# Patient Record
Sex: MALE | Race: BLACK OR AFRICAN AMERICAN | NOT HISPANIC OR LATINO | Employment: FULL TIME | URBAN - METROPOLITAN AREA
[De-identification: names, ages, dates, MRNs, and addresses within clinical notes are randomized per-mention and may not be internally consistent; named-entity substitution may affect disease eponyms.]

---

## 2018-01-11 ENCOUNTER — HOSPITAL ENCOUNTER (EMERGENCY)
Facility: HOSPITAL | Age: 28
Discharge: HOME | End: 2018-01-11
Attending: FAMILY MEDICINE
Payer: COMMERCIAL

## 2018-01-11 VITALS
DIASTOLIC BLOOD PRESSURE: 51 MMHG | SYSTOLIC BLOOD PRESSURE: 121 MMHG | TEMPERATURE: 99 F | RESPIRATION RATE: 16 BRPM | OXYGEN SATURATION: 97 % | HEART RATE: 98 BPM

## 2018-01-11 DIAGNOSIS — R68.89 FLU-LIKE SYMPTOMS: Primary | ICD-10-CM

## 2018-01-11 LAB
BUN SERPL-MCNC: 8 MG/DL (ref 6–30)
CHLORIDE SERPL-SCNC: 101 MMOL/L (ref 95–110)
CREAT SERPL-MCNC: 1 MG/DL (ref 0.5–1.4)
FLUAV AG SPEC QL IA: NEGATIVE
FLUBV AG SPEC QL IA: NEGATIVE
GLUCOSE SERPL-MCNC: 84 MG/DL (ref 70–110)
HCT VFR BLD CALC: 44 %PCV (ref 36–54)
POC IONIZED CALCIUM: 1.14 MMOL/L (ref 1.06–1.42)
POC TCO2 (MEASURED): 22 MMOL/L (ref 23–29)
POTASSIUM BLD-SCNC: 3.3 MMOL/L (ref 3.5–5.1)
SAMPLE: ABNORMAL
SODIUM BLD-SCNC: 137 MMOL/L (ref 136–145)
SPECIMEN SOURCE: NORMAL

## 2018-01-11 PROCEDURE — 99283 EMERGENCY DEPT VISIT LOW MDM: CPT | Mod: 25

## 2018-01-11 PROCEDURE — 96360 HYDRATION IV INFUSION INIT: CPT

## 2018-01-11 PROCEDURE — 25000003 PHARM REV CODE 250: Performed by: PHYSICIAN ASSISTANT

## 2018-01-11 PROCEDURE — 99283 EMERGENCY DEPT VISIT LOW MDM: CPT | Mod: ,,, | Performed by: PHYSICIAN ASSISTANT

## 2018-01-11 PROCEDURE — 87400 INFLUENZA A/B EACH AG IA: CPT

## 2018-01-11 PROCEDURE — 25000003 PHARM REV CODE 250: Performed by: FAMILY MEDICINE

## 2018-01-11 RX ORDER — OSELTAMIVIR PHOSPHATE 75 MG/1
75 CAPSULE ORAL 2 TIMES DAILY
Qty: 10 CAPSULE | Refills: 0 | Status: SHIPPED | OUTPATIENT
Start: 2018-01-11 | End: 2018-01-16

## 2018-01-11 RX ORDER — ACETAMINOPHEN 325 MG/1
650 TABLET ORAL
Status: COMPLETED | OUTPATIENT
Start: 2018-01-11 | End: 2018-01-11

## 2018-01-11 RX ORDER — BENZONATATE 100 MG/1
100 CAPSULE ORAL 3 TIMES DAILY PRN
Qty: 15 CAPSULE | Refills: 0 | Status: SHIPPED | OUTPATIENT
Start: 2018-01-11 | End: 2018-01-21

## 2018-01-11 RX ORDER — IBUPROFEN 600 MG/1
600 TABLET ORAL
Status: COMPLETED | OUTPATIENT
Start: 2018-01-11 | End: 2018-01-11

## 2018-01-11 RX ADMIN — SODIUM CHLORIDE 1000 ML: 0.9 INJECTION, SOLUTION INTRAVENOUS at 01:01

## 2018-01-11 RX ADMIN — ACETAMINOPHEN 650 MG: 325 TABLET ORAL at 11:01

## 2018-01-11 RX ADMIN — IBUPROFEN 600 MG: 600 TABLET, FILM COATED ORAL at 01:01

## 2018-01-11 NOTE — ED PROVIDER NOTES
Encounter Date: 1/11/2018       History     Chief Complaint   Patient presents with    Fever     began this morning      28 y/o AAM with no significant medical history presents to the ED c/o flu like symptoms since this morning. He reports fever, chills, myalgias, dry cough, nausea without vomiting, lightheadedness. He reports sick contacts at work. He did not have a flu vaccine this year. He denies chest pain, SOB, dysuria, diarrhea, abdominal pain, headache, weakness. He denies tobacco, alcohol, or drug use.      The history is provided by the patient.     Review of patient's allergies indicates:   Allergen Reactions    Shrimp Swelling     History reviewed. No pertinent past medical history.  Past Surgical History:   Procedure Laterality Date    NONE  10/17/2013     Family History   Problem Relation Age of Onset    Hypertension Father     Hypertension Paternal Grandmother     Stroke Paternal Grandfather     Diabetes Neg Hx     Cancer Neg Hx     Heart disease Neg Hx      Social History   Substance Use Topics    Smoking status: Former Smoker    Smokeless tobacco: Never Used    Alcohol use No     Review of Systems   Constitutional: Positive for chills, fatigue and fever.   HENT: Positive for congestion. Negative for rhinorrhea and sore throat.    Eyes: Negative for photophobia and visual disturbance.   Respiratory: Positive for cough. Negative for shortness of breath.    Cardiovascular: Negative for chest pain.   Gastrointestinal: Positive for nausea. Negative for abdominal pain, constipation, diarrhea and vomiting.   Genitourinary: Negative for dysuria and hematuria.   Musculoskeletal: Positive for myalgias. Negative for neck pain and neck stiffness.   Skin: Negative for rash and wound.   Allergic/Immunologic: Negative for food allergies.   Neurological: Positive for light-headedness. Negative for syncope, weakness, numbness and headaches.   Psychiatric/Behavioral: Negative for confusion.       Physical  Exam     Initial Vitals [01/11/18 1055]   BP Pulse Resp Temp SpO2   (!) 124/59 (!) 115 18 (!) 102.7 °F (39.3 °C) 98 %      MAP       80.67         Physical Exam    Nursing note and vitals reviewed.  Constitutional: He appears well-developed and well-nourished. He is not diaphoretic. No distress.   HENT:   Head: Normocephalic and atraumatic.   Mouth/Throat: Uvula is midline, oropharynx is clear and moist and mucous membranes are normal.   Neck: Normal range of motion. Neck supple.   Cardiovascular: Regular rhythm and normal heart sounds. Tachycardia present.  Exam reveals no gallop and no friction rub.    No murmur heard.  Pulmonary/Chest: Breath sounds normal. He has no wheezes. He has no rhonchi. He has no rales. He exhibits no tenderness.   Abdominal: Soft. Bowel sounds are normal. There is no tenderness. There is no rebound and no guarding.   Musculoskeletal: Normal range of motion.   Neurological: He is alert and oriented to person, place, and time.   Skin: Skin is warm and dry. No rash noted. No erythema.   Psychiatric: He has a normal mood and affect.         ED Course   Procedures  Labs Reviewed   ISTAT PROCEDURE - Abnormal; Notable for the following:        Result Value    POC Potassium 3.3 (*)     POC TCO2 (MEASURED) 22 (*)     All other components within normal limits   INFLUENZA A AND B ANTIGEN           Medical Decision Making:   History:   Old Medical Records: I decided to obtain old medical records.  Clinical Tests:   Lab Tests: Ordered and Reviewed       APC / Resident Notes:   28 y/o AAM with no significant medical history presents to the ED c/o flu like symptoms since this morning. Tachycardic. Patient is orthostatic. Lungs CTA. Abdomen soft and nontender. DDx includes but is not limited to influenza, dehydration, EDWIN, viral syndrome. Will get labs, IVF.     Influenza negative. Chem 8 with no acute abnormality.    He was given IVF, tylenol and advil in the ED. Symptoms consistent with influenza.  Will treat. Stable for discharge.    He was discharged with a prescription for tamiflu and tessalon.  He will follow up with his PCP.  All of the patient's questions were answered.  I reviewed the patient's chart and labs and discussed the case with my supervising physician.                 ED Course      Clinical Impression:   The encounter diagnosis was Flu-like symptoms.    Disposition:   Disposition: Discharged  Condition: Stable                        Radha Dodge PA-C  01/11/18 5104

## 2018-01-11 NOTE — ED TRIAGE NOTES
Pt reports he woke up this am having chills and subjective fever, reports he also had L leg pain but denies any other symptoms.  Reports he feels better after treatment given in waiting room.

## 2018-09-03 ENCOUNTER — NURSE TRIAGE (OUTPATIENT)
Dept: ADMINISTRATIVE | Facility: CLINIC | Age: 28
End: 2018-09-03

## 2019-02-23 ENCOUNTER — HOSPITAL ENCOUNTER (EMERGENCY)
Facility: HOSPITAL | Age: 29
Discharge: HOME | End: 2019-02-23
Attending: EMERGENCY MEDICINE
Payer: COMMERCIAL

## 2019-02-23 VITALS
DIASTOLIC BLOOD PRESSURE: 71 MMHG | WEIGHT: 150 LBS | HEART RATE: 76 BPM | BODY MASS INDEX: 22.22 KG/M2 | RESPIRATION RATE: 16 BRPM | HEIGHT: 69 IN | OXYGEN SATURATION: 99 % | SYSTOLIC BLOOD PRESSURE: 128 MMHG | TEMPERATURE: 98 F

## 2019-02-23 DIAGNOSIS — S61.213A LACERATION OF LEFT MIDDLE FINGER WITHOUT FOREIGN BODY WITHOUT DAMAGE TO NAIL, INITIAL ENCOUNTER: Primary | ICD-10-CM

## 2019-02-23 PROCEDURE — 90715 TDAP VACCINE 7 YRS/> IM: CPT | Performed by: PHYSICIAN ASSISTANT

## 2019-02-23 PROCEDURE — 12001 RPR S/N/AX/GEN/TRNK 2.5CM/<: CPT

## 2019-02-23 PROCEDURE — 99284 PR EMERGENCY DEPT VISIT,LEVEL IV: ICD-10-PCS | Mod: 25,,, | Performed by: PHYSICIAN ASSISTANT

## 2019-02-23 PROCEDURE — 63600175 PHARM REV CODE 636 W HCPCS: Performed by: PHYSICIAN ASSISTANT

## 2019-02-23 PROCEDURE — 12001 PR RESUPERF WND BODY <2.5CM: ICD-10-PCS | Mod: F2,,, | Performed by: PHYSICIAN ASSISTANT

## 2019-02-23 PROCEDURE — 99284 EMERGENCY DEPT VISIT MOD MDM: CPT | Mod: 25,,, | Performed by: PHYSICIAN ASSISTANT

## 2019-02-23 PROCEDURE — 99282 EMERGENCY DEPT VISIT SF MDM: CPT | Mod: 25

## 2019-02-23 PROCEDURE — 12001 RPR S/N/AX/GEN/TRNK 2.5CM/<: CPT | Mod: F2,,, | Performed by: PHYSICIAN ASSISTANT

## 2019-02-23 PROCEDURE — 90471 IMMUNIZATION ADMIN: CPT | Performed by: PHYSICIAN ASSISTANT

## 2019-02-23 RX ADMIN — CLOSTRIDIUM TETANI TOXOID ANTIGEN (FORMALDEHYDE INACTIVATED), CORYNEBACTERIUM DIPHTHERIAE TOXOID ANTIGEN (FORMALDEHYDE INACTIVATED), BORDETELLA PERTUSSIS TOXOID ANTIGEN (GLUTARALDEHYDE INACTIVATED), BORDETELLA PERTUSSIS FILAMENTOUS HEMAGGLUTININ ANTIGEN (FORMALDEHYDE INACTIVATED), BORDETELLA PERTUSSIS PERTACTIN ANTIGEN, AND BORDETELLA PERTUSSIS FIMBRIAE 2/3 ANTIGEN 0.5 ML: 5; 2; 2.5; 5; 3; 5 INJECTION, SUSPENSION INTRAMUSCULAR at 01:02

## 2019-02-23 NOTE — ED TRIAGE NOTES
30 y/o male presents to ed with cc left middle finger laceration, palmar surface, pt was emptying trash cans after a parade and cut finger, pt has full rom of fingers on left hand, bleeding controlled, pt denies numbness to distal fingertip

## 2020-03-09 ENCOUNTER — OFFICE VISIT (OUTPATIENT)
Dept: URGENT CARE | Facility: CLINIC | Age: 30
End: 2020-03-09
Payer: COMMERCIAL

## 2020-03-09 VITALS
HEIGHT: 69 IN | TEMPERATURE: 98 F | WEIGHT: 150 LBS | DIASTOLIC BLOOD PRESSURE: 81 MMHG | BODY MASS INDEX: 22.22 KG/M2 | OXYGEN SATURATION: 98 % | HEART RATE: 70 BPM | SYSTOLIC BLOOD PRESSURE: 125 MMHG

## 2020-03-09 DIAGNOSIS — A64 STD (MALE): ICD-10-CM

## 2020-03-09 DIAGNOSIS — R36.9 DISCHARGE FROM PENIS: Primary | ICD-10-CM

## 2020-03-09 LAB
BILIRUB UR QL STRIP: POSITIVE
GLUCOSE UR QL STRIP: NEGATIVE
KETONES UR QL STRIP: NEGATIVE
LEUKOCYTE ESTERASE UR QL STRIP: POSITIVE
PH, POC UA: 5 (ref 5–8)
POC BLOOD, URINE: POSITIVE
POC NITRATES, URINE: NEGATIVE
PROT UR QL STRIP: POSITIVE
SP GR UR STRIP: 1.02 (ref 1–1.03)
UROBILINOGEN UR STRIP-ACNC: 4 (ref 0.3–2.2)

## 2020-03-09 PROCEDURE — 96372 THER/PROPH/DIAG INJ SC/IM: CPT | Mod: S$GLB,,, | Performed by: FAMILY MEDICINE

## 2020-03-09 PROCEDURE — 87491 CHLMYD TRACH DNA AMP PROBE: CPT

## 2020-03-09 PROCEDURE — 81003 URINALYSIS AUTO W/O SCOPE: CPT | Mod: QW,S$GLB,, | Performed by: FAMILY MEDICINE

## 2020-03-09 PROCEDURE — 81003 POCT URINALYSIS, DIPSTICK, AUTOMATED, W/O SCOPE: ICD-10-PCS | Mod: QW,S$GLB,, | Performed by: FAMILY MEDICINE

## 2020-03-09 PROCEDURE — 99203 OFFICE O/P NEW LOW 30 MIN: CPT | Mod: 25,S$GLB,, | Performed by: FAMILY MEDICINE

## 2020-03-09 PROCEDURE — 99203 PR OFFICE/OUTPT VISIT, NEW, LEVL III, 30-44 MIN: ICD-10-PCS | Mod: 25,S$GLB,, | Performed by: FAMILY MEDICINE

## 2020-03-09 PROCEDURE — 96372 PR INJECTION,THERAP/PROPH/DIAG2ST, IM OR SUBCUT: ICD-10-PCS | Mod: S$GLB,,, | Performed by: FAMILY MEDICINE

## 2020-03-09 RX ORDER — CEFTRIAXONE 250 MG/1
250 INJECTION, POWDER, FOR SOLUTION INTRAMUSCULAR; INTRAVENOUS
Status: COMPLETED | OUTPATIENT
Start: 2020-03-09 | End: 2020-03-09

## 2020-03-09 RX ORDER — DOXYCYCLINE 100 MG/1
100 CAPSULE ORAL 2 TIMES DAILY
Qty: 20 CAPSULE | Refills: 0 | Status: SHIPPED | OUTPATIENT
Start: 2020-03-09 | End: 2020-03-19

## 2020-03-09 RX ADMIN — CEFTRIAXONE 250 MG: 250 INJECTION, POWDER, FOR SOLUTION INTRAMUSCULAR; INTRAVENOUS at 04:03

## 2020-03-09 NOTE — PATIENT INSTRUCTIONS
If You Think You Have an STD  Treating a sexually transmitted disease (STD) early limits the problems they can cause. If you have an STD, get treated right away. Ask your partner to be tested, too. Then avoid sex until youve finished treatment and your healthcare provider says its OK to have sex again.    Follow your treatment plan  Treatment depends on the type of STD you have. Common treatments include injections and oral pills or liquids. Creams and gels can be applied to sores caused by certain STDs. Follow the tips below:  · Get new treatment for each new STD.  · Dont use old medicine, even for the same STD. Use medicines as directed.  · Dont share medicine unless instructed to do so by your healthcare provider or clinic.  Talk to your partner  If you have an STD, its your duty to tell all your recent partners so they can be tested and treated. This is one important way to prevent the disease from being spread. Telling a partner that you have an STD can be hard. You may be embarrassed, angry, or afraid. Its often unclear who had the STD first. So try not to place blame. Your healthcare provider may offer some advice on how to begin.  Prevent future problems  Even after youve been treated, you can still be infected again. This is a common problem. It happens when a partner passes the STD back to you. To avoid this, your partner must be tested. He or she may also need treatment. After treatment, go to any scheduled follow-up visits. Then prevent future problems with safer sex. Limit your number of partners and always use a latex condom.  Diagnosing STDS  Your healthcare provider will take a health history and examine you. During your health history, you will be asked about your sex habits and health history. You may also be asked about drug use. Give honest answers. Your healthcare provider will then check your body for signs of STDs. He or she also may perform one or more of the following  tests:  · Fluid is swabbed from any sores. Samples also may be taken from the vagina, penis, mouth, or rectum. The samples are then tested for STDs.  · Blood or urine samples may be taken. They are checked for viruses or bacteria that cause STDs.  · For women, cells from the cervix (where the vagina and uterus meet) are checked for signs of cancer. This is called a Pap smear. If cell changes are found, a magnifying scope may be used to take a closer look (colposcopy).   Date Last Reviewed: 12/1/2016  © 9725-4412 [employer]. [address] [place] [address], [place] [address]. All rights reserved. This information is not intended as a substitute for professional medical care. Always follow your healthcare professional's instructions.

## 2020-03-09 NOTE — PROGRESS NOTES
"Subjective:       Patient ID: Samson Aguilera is a 30 y.o. male.    Vitals:  height is 5' 9" (1.753 m) and weight is 68 kg (150 lb). His temperature is 97.6 °F (36.4 °C). His blood pressure is 125/81 and his pulse is 70. His oxygen saturation is 98%.     Chief Complaint: Penile Discharge    Penile Discharge   The patient's primary symptoms include penile discharge and penile pain. The patient's pertinent negatives include no scrotal swelling or testicular pain. This is a new problem. Episode onset: 7 days ago. The problem occurs constantly. The problem has been gradually worsening. The pain is mild. Associated symptoms include chills, discolored urine, dysuria, frequency and urgency. Pertinent negatives include no abdominal pain, fever, nausea, rash or vomiting. The penile discharge was yellow. The symptoms are aggravated by urination and intercourse. Treatments tried: azo. The treatment provided no relief. He is sexually active. He inconsistently uses condoms. No, his partner does not have an STD. His past medical history is significant for chlamydia and gonorrhea.       Constitution: Positive for chills. Negative for fever.   Neck: Negative for painful lymph nodes.   Gastrointestinal: Negative for abdominal pain, nausea and vomiting.   Genitourinary: Positive for dysuria, frequency, urgency, penile discharge and penile pain. Negative for urine decreased, hematuria, history of kidney stones, genital trauma, painful intercourse, genital sore, painful ejaculation, penile swelling, scrotal swelling and testicular pain.   Musculoskeletal: Negative for back pain.   Skin: Negative for rash and lesion.   Hematologic/Lymphatic: Negative for swollen lymph nodes.       Objective:      Physical Exam   Constitutional: He is oriented to person, place, and time. He appears well-developed and well-nourished.   HENT:   Head: Normocephalic and atraumatic.   Right Ear: External ear normal.   Left Ear: External ear normal.   Nose: " Nose normal.   Mouth/Throat: Mucous membranes are normal.   Eyes: Conjunctivae and lids are normal.   Neck: Trachea normal and full passive range of motion without pain. Neck supple.   Cardiovascular: Normal rate, regular rhythm and normal heart sounds.   Pulmonary/Chest: Effort normal and breath sounds normal. No respiratory distress.   Abdominal: Soft. Normal appearance and bowel sounds are normal. He exhibits no distension, no abdominal bruit, no pulsatile midline mass and no mass. There is no tenderness.   Genitourinary: Testes normal. Uncircumcised. Discharge found.         Musculoskeletal: Normal range of motion. He exhibits no edema.   Neurological: He is alert and oriented to person, place, and time. He has normal strength.   Skin: Skin is warm, dry, intact, not diaphoretic and not pale.   Psychiatric: He has a normal mood and affect. His speech is normal and behavior is normal. Judgment and thought content normal. Cognition and memory are normal.   Nursing note and vitals reviewed.        Assessment:       1. Discharge from penis    2. STD (male)        Plan:         Discharge from penis  -     POCT Urinalysis, Dipstick, Automated, W/O Scope  -     cefTRIAXone injection 250 mg  -     doxycycline (MONODOX) 100 MG capsule; Take 1 capsule (100 mg total) by mouth 2 (two) times daily. for 10 days  Dispense: 20 capsule; Refill: 0  -     C. trachomatis/N. gonorrhoeae by AMP DNA [facility]; Urine    STD (male)

## 2020-03-10 LAB
C TRACH DNA SPEC QL NAA+PROBE: NOT DETECTED
N GONORRHOEA DNA SPEC QL NAA+PROBE: DETECTED

## 2020-03-11 ENCOUNTER — TELEPHONE (OUTPATIENT)
Dept: URGENT CARE | Facility: CLINIC | Age: 30
End: 2020-03-11

## 2020-03-11 NOTE — TELEPHONE ENCOUNTER
Called to notify patient of positive culture. Pt treated appropriately. Voicemail not set up. Unable to leave message.

## 2020-03-19 NOTE — TELEPHONE ENCOUNTER
Spoke with patient. Informed patient of results. Patient verbalized understanding. Patient reports feeling much better since visit.

## 2020-03-26 ENCOUNTER — OFFICE VISIT (OUTPATIENT)
Dept: URGENT CARE | Facility: CLINIC | Age: 30
End: 2020-03-26
Payer: COMMERCIAL

## 2020-03-26 VITALS
SYSTOLIC BLOOD PRESSURE: 116 MMHG | HEART RATE: 68 BPM | OXYGEN SATURATION: 98 % | RESPIRATION RATE: 20 BRPM | DIASTOLIC BLOOD PRESSURE: 77 MMHG | TEMPERATURE: 100 F

## 2020-03-26 DIAGNOSIS — R09.82 POST-NASAL DRIP: Primary | ICD-10-CM

## 2020-03-26 DIAGNOSIS — Z20.822 EXPOSURE TO COVID-19 VIRUS: ICD-10-CM

## 2020-03-26 LAB
CTP QC/QA: YES
CTP QC/QA: YES
FLUAV AG NPH QL: NEGATIVE
FLUBV AG NPH QL: NEGATIVE
S PYO RRNA THROAT QL PROBE: NEGATIVE

## 2020-03-26 PROCEDURE — 87880 POCT RAPID STREP A: ICD-10-PCS | Mod: QW,S$GLB,, | Performed by: NURSE PRACTITIONER

## 2020-03-26 PROCEDURE — 99214 OFFICE O/P EST MOD 30 MIN: CPT | Mod: 25,S$GLB,, | Performed by: NURSE PRACTITIONER

## 2020-03-26 PROCEDURE — 99214 PR OFFICE/OUTPT VISIT, EST, LEVL IV, 30-39 MIN: ICD-10-PCS | Mod: 25,S$GLB,, | Performed by: NURSE PRACTITIONER

## 2020-03-26 PROCEDURE — 87880 STREP A ASSAY W/OPTIC: CPT | Mod: QW,S$GLB,, | Performed by: NURSE PRACTITIONER

## 2020-03-26 PROCEDURE — 87804 INFLUENZA ASSAY W/OPTIC: CPT | Mod: QW,S$GLB,, | Performed by: NURSE PRACTITIONER

## 2020-03-26 PROCEDURE — 87804 POCT INFLUENZA A/B: ICD-10-PCS | Mod: 59,QW,S$GLB, | Performed by: NURSE PRACTITIONER

## 2020-03-26 RX ORDER — CETIRIZINE HYDROCHLORIDE, PSEUDOEPHEDRINE HYDROCHLORIDE 5; 120 MG/1; MG/1
1 TABLET, FILM COATED, EXTENDED RELEASE ORAL 2 TIMES DAILY
Qty: 20 TABLET | Refills: 1 | Status: SHIPPED | OUTPATIENT
Start: 2020-03-26 | End: 2021-03-26

## 2020-03-26 RX ORDER — PROMETHAZINE HYDROCHLORIDE AND DEXTROMETHORPHAN HYDROBROMIDE 6.25; 15 MG/5ML; MG/5ML
5 SYRUP ORAL
Qty: 118 ML | Refills: 0 | Status: SHIPPED | OUTPATIENT
Start: 2020-03-26 | End: 2020-04-05

## 2020-03-26 RX ORDER — BENZONATATE 200 MG/1
200 CAPSULE ORAL 3 TIMES DAILY PRN
Qty: 20 CAPSULE | Refills: 0 | Status: SHIPPED | OUTPATIENT
Start: 2020-03-26 | End: 2020-04-05

## 2020-03-26 RX ORDER — ALBUTEROL SULFATE 90 UG/1
2 AEROSOL, METERED RESPIRATORY (INHALATION) EVERY 6 HOURS PRN
Qty: 6.7 G | Refills: 0 | Status: SHIPPED | OUTPATIENT
Start: 2020-03-26 | End: 2022-11-04 | Stop reason: ALTCHOICE

## 2020-03-26 RX ORDER — MONTELUKAST SODIUM 10 MG/1
10 TABLET ORAL DAILY
Qty: 30 TABLET | Refills: 2 | Status: SHIPPED | OUTPATIENT
Start: 2020-03-26 | End: 2021-03-26

## 2020-03-26 NOTE — PROGRESS NOTES
Subjective:       Patient ID: Samson Aguilera is a 30 y.o. male.    Vitals:  temperature is 99.6 °F (37.6 °C). His blood pressure is 116/77 and his pulse is 68. His respiration is 20 and oxygen saturation is 98%.     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 Pt denies cough, SOB, wheezing and fever. Does have a PND that has been ongoing. Pt lives in an apartment.     Nausea   This is a new problem. The current episode started today. The problem occurs intermittently. The problem has been gradually worsening. Associated symptoms include nausea. Pertinent negatives include no arthralgias, chest pain, chills, congestion, coughing, fatigue, fever, headaches, joint swelling, myalgias, rash, sore throat, vertigo or vomiting. Nothing aggravates the symptoms. He has tried nothing for the symptoms.       Constitution: Negative for chills, fatigue and fever.   HENT: Negative for congestion and sore throat.    Neck: Negative for painful lymph nodes.   Cardiovascular: Negative for chest pain and leg swelling.   Eyes: Negative for double vision and blurred vision.   Respiratory: Negative for cough and shortness of breath.    Gastrointestinal: Positive for nausea. Negative for vomiting and diarrhea.   Genitourinary: Negative for dysuria, frequency and urgency.   Musculoskeletal: Negative for joint pain, joint swelling, muscle cramps and muscle ache.   Skin: Negative for color change, pale and rash.   Allergic/Immunologic: Negative for seasonal allergies.   Neurological: Negative for dizziness, history of vertigo, light-headedness, passing out and headaches.   Hematologic/Lymphatic: Negative for swollen lymph nodes, easy bruising/bleeding and history of blood clots. Does not bruise/bleed easily.   Psychiatric/Behavioral: Negative for nervous/anxious, sleep disturbance and depression. The patient is not  nervous/anxious.        Objective:      Physical Exam   Constitutional: He is oriented to person, place, and time. He appears well-developed and well-nourished. He is cooperative.  Non-toxic appearance. He does not have a sickly appearance. He does not appear ill. No distress.   HENT:   Head: Normocephalic and atraumatic.   Right Ear: Hearing, tympanic membrane, external ear and ear canal normal.   Left Ear: Hearing, tympanic membrane, external ear and ear canal normal.   Nose: Mucosal edema and rhinorrhea present. No nasal deformity. No epistaxis. Right sinus exhibits maxillary sinus tenderness. Right sinus exhibits no frontal sinus tenderness. Left sinus exhibits maxillary sinus tenderness. Left sinus exhibits no frontal sinus tenderness.   Mouth/Throat: Uvula is midline and mucous membranes are normal. No trismus in the jaw. Normal dentition. No uvula swelling. Posterior oropharyngeal edema and posterior oropharyngeal erythema present. No oropharyngeal exudate. Tonsils are 2+ on the right. Tonsils are 2+ on the left.   Eyes: Pupils are equal, round, and reactive to light. Conjunctivae, EOM and lids are normal. Right eye exhibits no discharge. Left eye exhibits no discharge. No scleral icterus.   Neck: Trachea normal, normal range of motion, full passive range of motion without pain and phonation normal. Neck supple. No neck rigidity. No edema and no erythema present.   Cardiovascular: Normal rate, regular rhythm, normal heart sounds, intact distal pulses and normal pulses.   Pulmonary/Chest: Effort normal and breath sounds normal. No respiratory distress. He has no decreased breath sounds. He has no rhonchi.   Abdominal: Soft. Normal appearance and bowel sounds are normal. He exhibits no distension, no pulsatile midline mass and no mass. There is no tenderness.   Musculoskeletal: Normal range of motion. He exhibits no edema or deformity.   Neurological: He is alert and oriented to person, place, and time. He is  not disoriented. No cranial nerve deficit or sensory deficit. He exhibits normal muscle tone. Coordination and gait normal.   ..Pt communicating without difficulty, speaks clearly and answers appropriately with unlabored breathing. No neuro deficits noted.      Skin: Skin is warm, dry, intact, not diaphoretic and not pale.   Psychiatric: He has a normal mood and affect. His speech is normal and behavior is normal. Judgment and thought content normal. Cognition and memory are normal.   Nursing note and vitals reviewed.        Assessment:       1. Post-nasal drip    2. Exposure to Covid-19 Virus        Plan:         Post-nasal drip  -     POCT Influenza A/B  -     POCT rapid strep A  -     albuterol (PROVENTIL/VENTOLIN HFA) 90 mcg/actuation inhaler; Inhale 2 puffs into the lungs every 6 (six) hours as needed for Wheezing. Cough and Shortness of Breath. Rescue  Dispense: 6.7 g; Refill: 0  -     promethazine-dextromethorphan (PROMETHAZINE-DM) 6.25-15 mg/5 mL Syrp; Take 5 mLs by mouth every 4 to 6 hours as needed.  Dispense: 118 mL; Refill: 0  -     benzonatate (TESSALON) 200 MG capsule; Take 1 capsule (200 mg total) by mouth 3 (three) times daily as needed for Cough.  Dispense: 20 capsule; Refill: 0  -     montelukast (SINGULAIR) 10 mg tablet; Take 1 tablet (10 mg total) by mouth once daily.  Dispense: 30 tablet; Refill: 2  -     cetirizine-pseudoephedrine 5-120 mg Tb12; Take 1 tablet by mouth 2 (two) times daily.  Dispense: 20 tablet; Refill: 1    Exposure to Covid-19 Virus

## 2020-03-26 NOTE — PATIENT INSTRUCTIONS
Urgent Care Management:  - Treatment plan discussed.  - PCP recommendations given.  - Return precautions advised.  - Patient agrees with and understands plan of care.    Patient Instructions, Education, Teaching and Summary of Visit:      RETURN TO CLINIC IF SYMPTOMS WORSEN OR CALL 911 IMMEDIATELY FOR SHORTNESS OF BREATH, CHEST PAIN, DIZZINESS, WORSENING PAIN, NAUSEA AND VOMITING, HEART PALPITATIONS, FEVER AND/OR NECK STIFFNESS. FOLLOW UP WITH PRIMARY CARE PROVIDER IN THE AM.    -Diagnosis and treatment plan discussed with patient.  -Patient agreed with my treatment plan.  -Patient will follow up with primary care provider or Specialty Provider, as discussed.     -If you were prescribed a narcotic or controlled medication, do not drive or operate heavy equipment or machinery while taking these medications.  -You must understand that you've received an Urgent Care treatment only and that you may be released before all your medical problems are known or treated.   -You, the patient, will arrange for follow up care as instructed.  -Follow up with your PCP or specialty clinic as directed in the next 1-2 weeks if not improved or as needed.    -You can call [phone] to schedule an appointment with the appropriate provider.  -If your condition worsens we recommend that you receive another evaluation at the emergency room immediately or contact your primary medical clinics after hours call service to discuss your concerns.  -Please return here or go to the Emergency Department for any concerns or worsening of condition.At this time you do not meet requirement for testing for COVID 19.  You are considered low risk.  Monitor symptoms closely, schedule tele health visit he develops new or worsening symptoms or have increased concern.     - I recommend that you self quarantine until you are fever free and symptoms resolve.    - Rest.    - Drink plenty of fluids.      - Viral upper respiratory infections typically run  their course in 10-14 days.     - Tylenol or Ibuprofen as directed as needed for fever/pain. Avoid tylenol if you have a history of liver disease. Do not take ibuprofen if you have a history of GI bleeding, kidney disease, or if you take blood thinners.     - You can take over-the-counter claritin, zyrtec, allegra, or xyzal as directed. These are antihistamines that can help with runny nose, nasal congestion, sneezing, and helps to dry up post-nasal drip, which usually causes sore throat and cough.   - If you do NOT have high blood pressure, you may use a decongestant form (D)  of this medication or if you do not take the D form, you can take sudafed  (pseudoephedrine) over the counter, which is a decongestant.    - You can use Flonase (fluticasone) nasal spray as directed for sinus congestion and postnasal drip. This is a steroid nasal spray that works locally over time to decrease the inflammation in your nose/sinuses and help with allergic symptoms. This is not an quick- relief spray like afrin, but it works well if used daily.  Discontinue if you develop nose bleed  - use nasal saline prior to Flonase.    - Use Ocean Spray Nasal Saline 1-3 puffs each nostril every 2-3 hours then blow out onto tissue. This is to irrigate the nasal passage way to clear the sinus openings. Use until sinus problem resolved.      - you can take plain Mucinex (guaifenesin) 1200 mg twice a day to help loosen mucous    -warm salt water gargles can help with sore throat    - warm tea with honey can help with cough. Honey is a natural cough suppressant.    - Take the tessalon (benzonatate) as needed as prescribed for cough   - Only take the promethazine- DM as directed, as needed at night for cough. This medication may cause drowsiness.     - Follow up with your PCP or specialty clinic as directed in the next 1-2 weeks if not improved or as needed.  You can call [phone] to schedule an appointment with the appropriate provider.       - Go to the ER if you develop new or worsening symptoms.     - You must understand that you have received an Urgent Care treatment only and that you may be released before all of your medical problems are known or treated.   - You, the patient, will arrange for follow up care as instructed.   - If your condition worsens or fails to improve we recommend that you receive another evaluation at the ER immediately or contact your PCP to discuss your concerns or return here.

## 2020-10-08 ENCOUNTER — OFFICE VISIT (OUTPATIENT)
Dept: URGENT CARE | Facility: CLINIC | Age: 30
End: 2020-10-08
Payer: COMMERCIAL

## 2020-10-08 VITALS
HEIGHT: 69 IN | DIASTOLIC BLOOD PRESSURE: 71 MMHG | HEART RATE: 85 BPM | WEIGHT: 155 LBS | BODY MASS INDEX: 22.96 KG/M2 | TEMPERATURE: 98 F | OXYGEN SATURATION: 98 % | SYSTOLIC BLOOD PRESSURE: 121 MMHG

## 2020-10-08 DIAGNOSIS — J02.9 VIRAL PHARYNGITIS: Primary | ICD-10-CM

## 2020-10-08 LAB
CTP QC/QA: YES
SARS-COV-2 RDRP RESP QL NAA+PROBE: NEGATIVE

## 2020-10-08 PROCEDURE — 99213 OFFICE O/P EST LOW 20 MIN: CPT | Mod: S$GLB,,, | Performed by: STUDENT IN AN ORGANIZED HEALTH CARE EDUCATION/TRAINING PROGRAM

## 2020-10-08 PROCEDURE — U0002: ICD-10-PCS | Mod: QW,S$GLB,, | Performed by: STUDENT IN AN ORGANIZED HEALTH CARE EDUCATION/TRAINING PROGRAM

## 2020-10-08 PROCEDURE — U0002 COVID-19 LAB TEST NON-CDC: HCPCS | Mod: QW,S$GLB,, | Performed by: STUDENT IN AN ORGANIZED HEALTH CARE EDUCATION/TRAINING PROGRAM

## 2020-10-08 PROCEDURE — 99213 PR OFFICE/OUTPT VISIT, EST, LEVL III, 20-29 MIN: ICD-10-PCS | Mod: S$GLB,,, | Performed by: STUDENT IN AN ORGANIZED HEALTH CARE EDUCATION/TRAINING PROGRAM

## 2020-10-08 NOTE — PATIENT INSTRUCTIONS
Viral Pharyngitis (Sore Throat)    You (or your child, if your child is the patient) have pharyngitis (sore throat). This infection is caused by a virus. It can cause throat pain that is worse when swallowing, aching all over, headache, and fever. The infection may be spread by coughing, kissing, or touching others after touching your mouth or nose. Antibiotic medications do not work against viruses, so they are not used for treating this condition.  Home care  · If your symptoms are severe, rest at home. Return to work or school when you feel well enough.   · Drink plenty of fluids to avoid dehydration.  · For children: Use acetaminophen for fever, fussiness or discomfort. In infants over six months of age, you may use ibuprofen instead of acetaminophen. (NOTE: If your child has chronic liver or kidney disease or ever had a stomach ulcer or GI bleeding, talk with your doctor before using these medicines.) (NOTE: Aspirin should never be used in anyone under 18 years of age who is ill with a fever. It may cause severe liver damage.)   · For adults: You may use acetaminophen or ibuprofen to control pain or fever, unless another medicine was prescribed for this. (NOTE: If you have chronic liver or kidney disease or ever had a stomach ulcer or GI bleeding, talk with your doctor before using these medicines.)  · Throat lozenges or numbing throat sprays can help reduce pain. Gargling with warm salt water will also help reduce throat pain. For this, dissolve 1/2 teaspoon of salt in 1 glass of warm water. To help soothe a sore throat, children can sip on juice or a popsicle. Children 5 years and older can also suck on a lollipop or hard candy.  · Avoid salty or spicy foods, which can be irritating to the throat.  Follow-up care  Follow up with your healthcare provider or our staff if you are not improving over the next week.  When to seek medical advice  Call your healthcare provider right away if any of these  occur:  · Fever as directed by your doctor.  For children, seek care if:  ¨ Your child is of any age and has repeated fevers above 104°F (40°C).  ¨ Your child is younger than 2 years of age and has a fever of 100.4°F (38°C) that continues for more than 1 day.  ¨ Your child is 2 years old or older and has a fever of 100.4°F (38°C) that continues for more than 3 days.  · New or worsening ear pain, sinus pain, or headache  · Painful lumps in the back of neck  · Stiff neck  · Lymph nodes are getting larger  · Inability to swallow liquids, excessive drooling, or inability to open mouth wide due to throat pain  · Signs of dehydration (very dark urine or no urine, sunken eyes, dizziness)  · Trouble breathing or noisy breathing  · Muffled voice  · New rash  · Child appears to be getting sicker  Date Last Reviewed: 4/13/2015  © 8112-9392 [employer]. [address] [place] [address], [place] [address]. All rights reserved. This information is not intended as a substitute for professional medical care. Always follow your healthcare professional's instructions.

## 2020-10-08 NOTE — LETTER
[id number]  [address] ? [place], [address] ? Phone [phone] ? Fax [phone]           Return to Work/School    Patient: Samson Aguilera  YOB: 1990   Date: 10/08/2020      To Whom It May Concern:     Samson Aguilera was in contact with/seen in my office on 10/08/2020. COVID-19 is present in our communities across the [facility]. Not all patients are eligible or appropriate to be tested. In this situation, your employee meets the following criteria:     Samson Aguilera has met the criteria for COVID-19 testing and has a NEGATIVE result. The employee can return to work once their symptoms have improved and they are without fever for 24 hours without the use of fever reducing medications (Tylenol, Motrin, etc).     If you have any questions or concerns, or if I can be of further assistance, please do not hesitate to contact me.     Sincerely,    Andrade De La Rosa MD

## 2020-10-08 NOTE — PROGRESS NOTES
Subjective:       Patient ID: Samson Aguilera is a 30 y.o. male.    Vitals:  temperature is 97.8 °F (36.6 °C). His pulse is 85. His oxygen saturation is 98%.     Chief Complaint: Sore Throat    Pt presents with COVID concerns. Reports sore throat and nasal congestion that started last night. No known sick contacts. Denied f/c, GI sx's, or lower respiratory sx's.    Sore Throat   This is a new problem. The current episode started yesterday. The problem has been unchanged. Neither side of throat is experiencing more pain than the other. There has been no fever. The pain is mild. Associated symptoms include congestion. Pertinent negatives include no abdominal pain, coughing, diarrhea, ear pain, headaches, hoarse voice, plugged ear sensation, neck pain, shortness of breath, stridor, swollen glands, trouble swallowing or vomiting. He has had no exposure to strep or mono. He has tried nothing for the symptoms. The treatment provided no relief.       Constitution: Negative for chills, sweating, fatigue and fever.   HENT: Positive for congestion and sore throat. Negative for ear pain, sinus pain, sinus pressure, trouble swallowing and voice change.    Neck: Negative for neck pain and painful lymph nodes.   Cardiovascular: Negative for chest pain, palpitations and sob on exertion.   Eyes: Negative for eye discharge, eye itching and eye redness.   Respiratory: Negative for chest tightness, cough, sputum production, shortness of breath, stridor, wheezing and asthma.    Gastrointestinal: Negative for abdominal pain, nausea, vomiting and diarrhea.   Musculoskeletal: Negative for joint pain, joint swelling and muscle ache.   Skin: Negative for color change, rash and lesion.   Allergic/Immunologic: Negative for seasonal allergies and asthma.   Neurological: Negative for dizziness, light-headedness and headaches.   Hematologic/Lymphatic: Negative for swollen lymph nodes.   Psychiatric/Behavioral: Negative for confusion,  agitation and sleep disturbance.       Objective:      Physical Exam   Constitutional: He appears well-developed. No distress.   HENT:   Head: Normocephalic and atraumatic.   Ears:   Right Ear: External ear normal.   Left Ear: External ear normal.   Mouth/Throat: Uvula is midline and mucous membranes are normal. Posterior oropharyngeal erythema present. No oropharyngeal exudate, posterior oropharyngeal edema or tonsillar abscesses. Tonsils are 2+ on the right. Tonsils are 2+ on the left. No tonsillar exudate.   Eyes: Conjunctivae and EOM are normal. Right eye exhibits no discharge. Left eye exhibits no discharge.   Neck: Normal range of motion. Neck supple.   Cardiovascular: Normal rate, regular rhythm and normal heart sounds.   Pulmonary/Chest: Effort normal and breath sounds normal. No stridor. He has no wheezes. He has no rales.   Lymphadenopathy:     He has no cervical adenopathy.   Neurological: He is alert.   Skin: Skin is warm, dry and no rash. Psychiatric: His behavior is normal.   Vitals reviewed.    Recent Lab Results       10/08/20  0932         Acceptable Yes     SARS-CoV-2 RNA, Amplification, Qual Negative             Assessment:       1. Viral pharyngitis        Plan:         Viral pharyngitis  -     POCT COVID-19 Rapid Screening  - discussed negative result with patient. Counseled Symptomatic patient without known exposure to continue home quarantine/isolation per CDC guidelines in event of false negative rapid COVID test    Follow up if symptoms worsen or fail to improve.    Andrade De La Rosa MD/MPH  [facility]

## 2021-06-03 ENCOUNTER — HOSPITAL ENCOUNTER (EMERGENCY)
Facility: HOSPITAL | Age: 31
Discharge: HOME | End: 2021-06-03
Attending: EMERGENCY MEDICINE
Payer: COMMERCIAL

## 2021-06-03 VITALS
TEMPERATURE: 99 F | BODY MASS INDEX: 22.22 KG/M2 | WEIGHT: 150 LBS | RESPIRATION RATE: 16 BRPM | HEIGHT: 69 IN | DIASTOLIC BLOOD PRESSURE: 67 MMHG | OXYGEN SATURATION: 99 % | HEART RATE: 90 BPM | SYSTOLIC BLOOD PRESSURE: 132 MMHG

## 2021-06-03 DIAGNOSIS — J06.9 URI (UPPER RESPIRATORY INFECTION): Primary | ICD-10-CM

## 2021-06-03 LAB
CTP QC/QA: YES
HCV AB SERPL QL IA: NEGATIVE
HIV 1+2 AB+HIV1 P24 AG SERPL QL IA: NEGATIVE
SARS-COV-2 RDRP RESP QL NAA+PROBE: NEGATIVE

## 2021-06-03 PROCEDURE — 86703 HIV-1/HIV-2 1 RESULT ANTBDY: CPT | Performed by: EMERGENCY MEDICINE

## 2021-06-03 PROCEDURE — 93010 EKG 12-LEAD: ICD-10-PCS | Mod: ,,, | Performed by: INTERNAL MEDICINE

## 2021-06-03 PROCEDURE — 99283 EMERGENCY DEPT VISIT LOW MDM: CPT | Mod: CS,,, | Performed by: PHYSICIAN ASSISTANT

## 2021-06-03 PROCEDURE — 99283 PR EMERGENCY DEPT VISIT,LEVEL III: ICD-10-PCS | Mod: CS,,, | Performed by: PHYSICIAN ASSISTANT

## 2021-06-03 PROCEDURE — 86803 HEPATITIS C AB TEST: CPT | Performed by: EMERGENCY MEDICINE

## 2021-06-03 PROCEDURE — 25000003 PHARM REV CODE 250: Performed by: PHYSICIAN ASSISTANT

## 2021-06-03 PROCEDURE — 93010 ELECTROCARDIOGRAM REPORT: CPT | Mod: ,,, | Performed by: INTERNAL MEDICINE

## 2021-06-03 PROCEDURE — 99284 EMERGENCY DEPT VISIT MOD MDM: CPT | Mod: 25

## 2021-06-03 PROCEDURE — U0002 COVID-19 LAB TEST NON-CDC: HCPCS | Performed by: EMERGENCY MEDICINE

## 2021-06-03 PROCEDURE — 93005 ELECTROCARDIOGRAM TRACING: CPT

## 2021-06-03 PROCEDURE — 63600175 PHARM REV CODE 636 W HCPCS: Performed by: PHYSICIAN ASSISTANT

## 2021-06-03 RX ORDER — FLUTICASONE PROPIONATE 50 MCG
1 SPRAY, SUSPENSION (ML) NASAL 2 TIMES DAILY PRN
Qty: 15 G | Refills: 0 | Status: SHIPPED | OUTPATIENT
Start: 2021-06-03 | End: 2022-11-04

## 2021-06-03 RX ORDER — PREDNISONE 20 MG/1
60 TABLET ORAL
Status: COMPLETED | OUTPATIENT
Start: 2021-06-03 | End: 2021-06-03

## 2021-06-03 RX ORDER — PSEUDOEPHEDRINE HCL 30 MG
60 TABLET ORAL
Status: COMPLETED | OUTPATIENT
Start: 2021-06-03 | End: 2021-06-03

## 2021-06-03 RX ORDER — NAPROXEN 500 MG/1
500 TABLET ORAL
Status: COMPLETED | OUTPATIENT
Start: 2021-06-03 | End: 2021-06-03

## 2021-06-03 RX ADMIN — PSEUDOEPHEDRINE HCL 60 MG: 30 TABLET, FILM COATED ORAL at 12:06

## 2021-06-03 RX ADMIN — NAPROXEN 500 MG: 500 TABLET ORAL at 12:06

## 2021-06-03 RX ADMIN — PREDNISONE 60 MG: 20 TABLET ORAL at 12:06

## 2021-06-05 ENCOUNTER — HOSPITAL ENCOUNTER (EMERGENCY)
Facility: HOSPITAL | Age: 31
Discharge: HOME | End: 2021-06-05
Attending: EMERGENCY MEDICINE
Payer: COMMERCIAL

## 2021-06-05 VITALS
TEMPERATURE: 99 F | WEIGHT: 150 LBS | BODY MASS INDEX: 22.22 KG/M2 | DIASTOLIC BLOOD PRESSURE: 77 MMHG | SYSTOLIC BLOOD PRESSURE: 134 MMHG | HEART RATE: 88 BPM | OXYGEN SATURATION: 100 % | RESPIRATION RATE: 18 BRPM | HEIGHT: 69 IN

## 2021-06-05 DIAGNOSIS — K11.20 PAROTITIS: Primary | ICD-10-CM

## 2021-06-05 PROCEDURE — 25000003 PHARM REV CODE 250: Performed by: PHYSICIAN ASSISTANT

## 2021-06-05 PROCEDURE — 99284 EMERGENCY DEPT VISIT MOD MDM: CPT

## 2021-06-05 PROCEDURE — 99284 PR EMERGENCY DEPT VISIT,LEVEL IV: ICD-10-PCS | Mod: ,,, | Performed by: PHYSICIAN ASSISTANT

## 2021-06-05 PROCEDURE — 99284 EMERGENCY DEPT VISIT MOD MDM: CPT | Mod: ,,, | Performed by: PHYSICIAN ASSISTANT

## 2021-06-05 RX ORDER — AMOXICILLIN AND CLAVULANATE POTASSIUM 875; 125 MG/1; MG/1
1 TABLET, FILM COATED ORAL
Status: COMPLETED | OUTPATIENT
Start: 2021-06-05 | End: 2021-06-05

## 2021-06-05 RX ORDER — NAPROXEN 500 MG/1
500 TABLET ORAL 2 TIMES DAILY PRN
Qty: 10 TABLET | Refills: 0 | Status: SHIPPED | OUTPATIENT
Start: 2021-06-05 | End: 2022-01-07 | Stop reason: SDUPTHER

## 2021-06-05 RX ORDER — AMOXICILLIN AND CLAVULANATE POTASSIUM 875; 125 MG/1; MG/1
1 TABLET, FILM COATED ORAL 2 TIMES DAILY
Qty: 14 TABLET | Refills: 0 | Status: SHIPPED | OUTPATIENT
Start: 2021-06-05 | End: 2021-06-12

## 2021-06-05 RX ADMIN — AMOXICILLIN AND CLAVULANATE POTASSIUM 1 TABLET: 875; 125 TABLET, FILM COATED ORAL at 10:06

## 2021-07-13 ENCOUNTER — OCCUPATIONAL HEALTH (OUTPATIENT)
Dept: URGENT CARE | Facility: CLINIC | Age: 31
End: 2021-07-13
Payer: COMMERCIAL

## 2021-07-13 ENCOUNTER — OCCUPATIONAL HEALTH (OUTPATIENT)
Dept: URGENT CARE | Facility: CLINIC | Age: 31
End: 2021-07-13

## 2021-07-13 DIAGNOSIS — Z02.83 ENCOUNTER FOR DRUG SCREENING: Primary | ICD-10-CM

## 2021-07-13 LAB — POC BREATH ALCOHOL: NEGATIVE

## 2021-07-13 PROCEDURE — 99199 UNLISTED SPECIAL SVC PX/RPRT: CPT | Mod: S$GLB,,, | Performed by: PREVENTIVE MEDICINE

## 2021-07-13 PROCEDURE — 82075 POCT ALCOHOL BREATH TEST: ICD-10-PCS | Mod: S$GLB,,, | Performed by: PREVENTIVE MEDICINE

## 2021-07-13 PROCEDURE — 82075 ASSAY OF BREATH ETHANOL: CPT | Mod: S$GLB,,, | Performed by: PREVENTIVE MEDICINE

## 2021-07-13 PROCEDURE — 99199 OCC MED MRO FEE: ICD-10-PCS | Mod: S$GLB,,, | Performed by: PREVENTIVE MEDICINE

## 2021-07-13 PROCEDURE — 80305 OOH COLLECTION ONLY DRUG SCREEN: ICD-10-PCS | Mod: S$GLB,,, | Performed by: PREVENTIVE MEDICINE

## 2021-07-13 PROCEDURE — 80305 DRUG TEST PRSMV DIR OPT OBS: CPT | Mod: S$GLB,,, | Performed by: PREVENTIVE MEDICINE

## 2021-11-06 NOTE — ED PROVIDER NOTES
Encounter Date: 2/23/2019       History     Chief Complaint   Patient presents with    Laceration     Pt was working at [employer], moved a trash bag and something from trash caught his finger causing laceration to middle finger. Bleeding controlled.      Patient is a 29 year old male with no significant PMHX. He presents to the ED for laceration. Patient reports injuring his left middle finger, while picking up trash after [employer]. Unknown tdap. He denies fever,chills, nausea, vomiting, sob, chest pain, abd pain, dysuria, diarrhea, or constipation. He is a former smoker and denies alcohol use.          Review of patient's allergies indicates:   Allergen Reactions    Shrimp Swelling     History reviewed. No pertinent past medical history.  Past Surgical History:   Procedure Laterality Date    NONE  10/17/2013     Family History   Problem Relation Age of Onset    Hypertension Father     Hypertension Paternal Grandmother     Stroke Paternal Grandfather     Diabetes Neg Hx     Cancer Neg Hx     Heart disease Neg Hx      Social History     Tobacco Use    Smoking status: Former Smoker    Smokeless tobacco: Never Used   Substance Use Topics    Alcohol use: No    Drug use: No     Review of Systems   Constitutional: Negative for fever.   HENT: Negative for sore throat.    Respiratory: Negative for shortness of breath.    Cardiovascular: Negative for chest pain.   Gastrointestinal: Negative for abdominal pain, nausea and vomiting.   Genitourinary: Negative for dysuria.   Musculoskeletal: Negative for back pain.   Skin: Positive for wound. Negative for rash.   Neurological: Negative for weakness.   Hematological: Does not bruise/bleed easily.       Physical Exam     Initial Vitals [02/23/19 0124]   BP Pulse Resp Temp SpO2   128/71 76 16 98.3 °F (36.8 °C) 99 %      MAP       --         Physical Exam    Vitals reviewed.  Constitutional: He appears well-developed and well-nourished. No distress.   HENT:   Head:  Normocephalic.   Eyes: Conjunctivae are normal.   Neck: Normal range of motion.   Cardiovascular: Normal rate and regular rhythm.   No murmur heard.  Pulses:       Radial pulses are 2+ on the right side, and 2+ on the left side.   Pulmonary/Chest: Breath sounds normal. No respiratory distress. He has no wheezes. He has no rales.   Musculoskeletal: Normal range of motion.        Right hand: Decreased sensation is not present in the ulnar distribution, is not present in the medial distribution and is not present in the radial distribution.        Left hand: Decreased sensation is not present in the ulnar distribution, is not present in the medial redistribution and is not present in the radial distribution.   Neurological: He is alert and oriented to person, place, and time. He has normal strength.   Skin: Skin is warm and dry. Laceration (over palmar surface of left third phalange nearing PIP) noted. No erythema.         ED Course   Lac Repair  Date/Time: 2/23/2019 4:00 AM  Performed by: Lila Alvarez PA-C  Authorized by: Maksim Naidu MD   Body area: upper extremity  Location details: left long finger  Laceration length: 2 cm  Foreign bodies: no foreign bodies  Tendon involvement: none  Nerve involvement: none  Vascular damage: no  Preparation: Patient was prepped and draped in the usual sterile fashion.  Irrigation solution: saline  Irrigation method: jet lavage  Amount of cleaning: standard  Debridement: none  Degree of undermining: none  Skin closure: Steri-Strips  Technique: simple  Approximation: close  Approximation difficulty: simple  Dressing: non-stick sterile dressing and splint for protection  Patient tolerance: Patient tolerated the procedure well with no immediate complications        Labs Reviewed - No data to display       Imaging Results          X-Ray Hand 3 View Left (Final result)  Result time 02/23/19 02:23:18    Final result by Rex Pickett MD (02/23/19 02:23:18)                  Impression:      No acute bony abnormality.      Electronically signed by: Rex Pickett MD  Date:    02/23/2019  Time:    02:23             Narrative:    EXAMINATION:  XR HAND COMPLETE 3 VIEW LEFT    CLINICAL HISTORY:  laceration of middle finger;.    TECHNIQUE:  PA, lateral, and oblique views of the left hand were performed.    COMPARISON:  None.    FINDINGS:  No evidence of acute fracture or dislocation.  Bandage material surrounds the 3rd digit.  No distinct radiopaque foreign body.  Soft tissues are relatively symmetric.                                       APC / Resident Notes:   Patient is a 29 year old male presents to the ED for emergent evaluation of laceration.     Hemostasis was achieved holding pressure. The wound was irrigated and explored to the base. The wound was repaired at the bedside as detailed in the procedure note.Tetanus updated in the ED. Will order xray. Will continue to monitor.     Differential diagnoses include, but are not limited to: retained foreign body, laceration, contusion, or sprain.     Xray No acute bony abnormality.    I have discussed emergency department findings, and plan with the patient. Will discharge home with F/U with PCP. Patient verbalizes understanding of plan and agrees. Return precautions given.     I have discussed and reviewed with my supervising physician.        Clinical Impression:       ICD-10-CM ICD-9-CM   1. Laceration of left middle finger without foreign body without damage to nail, initial encounter S61.213A 883.0         Disposition:   Disposition: Discharged  Condition: Stable                        Lila Alvarez PA-C  02/23/19 0403     pain, finger

## 2021-11-23 ENCOUNTER — CLINICAL SUPPORT (OUTPATIENT)
Dept: URGENT CARE | Facility: CLINIC | Age: 31
End: 2021-11-23
Payer: COMMERCIAL

## 2021-11-23 DIAGNOSIS — Z11.52 ENCOUNTER FOR SCREENING LABORATORY TESTING FOR COVID-19 VIRUS IN ASYMPTOMATIC PATIENT: Primary | ICD-10-CM

## 2021-11-23 DIAGNOSIS — Z01.812 ENCOUNTER FOR SCREENING LABORATORY TESTING FOR COVID-19 VIRUS IN ASYMPTOMATIC PATIENT: Primary | ICD-10-CM

## 2021-11-23 LAB
CTP QC/QA: YES
SARS-COV-2 RDRP RESP QL NAA+PROBE: NEGATIVE

## 2021-11-23 PROCEDURE — U0002 COVID-19 LAB TEST NON-CDC: HCPCS | Mod: QW,S$GLB,, | Performed by: NURSE PRACTITIONER

## 2021-11-23 PROCEDURE — U0002: ICD-10-PCS | Mod: QW,S$GLB,, | Performed by: NURSE PRACTITIONER

## 2021-11-23 PROCEDURE — 99211 PR OFFICE/OUTPT VISIT, EST, LEVL I: ICD-10-PCS | Mod: S$GLB,CS,, | Performed by: NURSE PRACTITIONER

## 2021-11-23 PROCEDURE — 99211 OFF/OP EST MAY X REQ PHY/QHP: CPT | Mod: S$GLB,CS,, | Performed by: NURSE PRACTITIONER

## 2022-01-07 ENCOUNTER — HOSPITAL ENCOUNTER (EMERGENCY)
Facility: HOSPITAL | Age: 32
Discharge: HOME | End: 2022-01-07
Attending: EMERGENCY MEDICINE
Payer: COMMERCIAL

## 2022-01-07 VITALS
DIASTOLIC BLOOD PRESSURE: 50 MMHG | HEART RATE: 108 BPM | SYSTOLIC BLOOD PRESSURE: 104 MMHG | RESPIRATION RATE: 20 BRPM | TEMPERATURE: 99 F | OXYGEN SATURATION: 98 % | WEIGHT: 150 LBS | BODY MASS INDEX: 22.15 KG/M2

## 2022-01-07 DIAGNOSIS — B34.9 VIRAL SYNDROME: Primary | ICD-10-CM

## 2022-01-07 DIAGNOSIS — Z20.822 COVID-19 VIRUS NOT DETECTED: ICD-10-CM

## 2022-01-07 LAB
CTP QC/QA: YES
CTP QC/QA: YES
POC MOLECULAR INFLUENZA A AGN: NEGATIVE
POC MOLECULAR INFLUENZA B AGN: NEGATIVE
SARS-COV-2 RDRP RESP QL NAA+PROBE: NEGATIVE

## 2022-01-07 PROCEDURE — U0002 COVID-19 LAB TEST NON-CDC: HCPCS | Performed by: EMERGENCY MEDICINE

## 2022-01-07 PROCEDURE — 99284 EMERGENCY DEPT VISIT MOD MDM: CPT | Mod: CS,,, | Performed by: PHYSICIAN ASSISTANT

## 2022-01-07 PROCEDURE — 99284 EMERGENCY DEPT VISIT MOD MDM: CPT | Mod: 25

## 2022-01-07 PROCEDURE — 25000003 PHARM REV CODE 250: Performed by: PHYSICIAN ASSISTANT

## 2022-01-07 PROCEDURE — 87502 INFLUENZA DNA AMP PROBE: CPT

## 2022-01-07 PROCEDURE — 99284 PR EMERGENCY DEPT VISIT,LEVEL IV: ICD-10-PCS | Mod: CS,,, | Performed by: PHYSICIAN ASSISTANT

## 2022-01-07 RX ORDER — NAPROXEN 500 MG/1
500 TABLET ORAL 2 TIMES DAILY PRN
Qty: 10 TABLET | Refills: 0 | Status: SHIPPED | OUTPATIENT
Start: 2022-01-07 | End: 2022-11-04

## 2022-01-07 RX ORDER — ONDANSETRON 4 MG/1
4 TABLET, ORALLY DISINTEGRATING ORAL
Status: COMPLETED | OUTPATIENT
Start: 2022-01-07 | End: 2022-01-07

## 2022-01-07 RX ORDER — ACETAMINOPHEN 500 MG
1000 TABLET ORAL
Status: COMPLETED | OUTPATIENT
Start: 2022-01-07 | End: 2022-01-07

## 2022-01-07 RX ORDER — ONDANSETRON 4 MG/1
4 TABLET, ORALLY DISINTEGRATING ORAL EVERY 8 HOURS PRN
Qty: 12 TABLET | Refills: 0 | OUTPATIENT
Start: 2022-01-07 | End: 2022-10-27

## 2022-01-07 RX ADMIN — ACETAMINOPHEN 1000 MG: 500 TABLET ORAL at 03:01

## 2022-01-07 RX ADMIN — ONDANSETRON 4 MG: 4 TABLET, ORALLY DISINTEGRATING ORAL at 03:01

## 2022-01-07 NOTE — DISCHARGE INSTRUCTIONS
-flu and covid negative. Given current pandemic, very possible that your covid test is a false negative or you were tested too early  -given fever and viral syndrome, stay home from work  -take medication for fever, Add 1000mg tylenol every 8 hours if needed for fever and pain control. Drink plenty of fluids  -return to ED for severe cough, trouble breathing or uncontrollable vomiting

## 2022-01-07 NOTE — Clinical Note
"Samson Godinezmitch" Willy was seen and treated in our emergency department on 1/7/2022.  He may return to work on 01/12/2022.       If you have any questions or concerns, please don't hesitate to call.      Jenifer Bhardwaj PA-C"

## 2022-01-07 NOTE — ED TRIAGE NOTES
Pt. Is a 31 y.o. male presenting to the ED via personal transport from home. Pt. Reports beginning today he has had headache, body aches, and chills.

## 2022-01-08 NOTE — ED PROVIDER NOTES
Encounter Date: 1/7/2022       History     Chief Complaint   Patient presents with    feeling tired     And nausea     Patient is a 31yoM who presents for viral syndrome; pertinent PMHx h/o STI.  Patient reports two days of myalgias, fatigue, chills with headache and intermittent nausea.  Denies associated cough, CP, shortness of breath, abdominal pain, dysuria, rash, arthralgias, V/C/D.  Patient is not vaccinated.  +known COVID exposure. No Measured fever at home.   The patients available PMH, PSH, Social History, medications, allergies, and triage vital signs were reviewed just prior to their medical evaluation.  A ten point review of systems was completed and is negative except as documented above.  Patient denies any other acute medical complaint.    Please be advised this text was dictated with [employer] software and may contain errors due to translation.           Review of patient's allergies indicates:   Allergen Reactions    Shrimp Swelling     Past Medical History:   Diagnosis Date    STD (male)      Past Surgical History:   Procedure Laterality Date    NONE  10/17/2013     Family History   Problem Relation Age of Onset    Hypertension Father     Hypertension Paternal Grandmother     Stroke Paternal Grandfather     Diabetes Neg Hx     Cancer Neg Hx     Heart disease Neg Hx      Social History     Tobacco Use    Smoking status: Former Smoker    Smokeless tobacco: Never Used   Substance Use Topics    Alcohol use: No    Drug use: No     Review of Systems   Constitutional: Positive for chills and fatigue. Negative for fever.   HENT: Negative for congestion, sore throat and trouble swallowing.    Respiratory: Negative for cough and shortness of breath.    Cardiovascular: Negative for chest pain.   Gastrointestinal: Positive for nausea. Negative for abdominal pain, constipation, diarrhea and vomiting.   Genitourinary: Negative for dysuria, flank pain, frequency, hematuria and penile discharge.    Musculoskeletal: Positive for myalgias. Negative for arthralgias and back pain.   Skin: Negative for rash.   Neurological: Positive for headaches. Negative for weakness.   Hematological: Does not bruise/bleed easily.   Psychiatric/Behavioral: Negative for confusion.       Physical Exam     Initial Vitals [01/07/22 1502]   BP Pulse Resp Temp SpO2   104/61 (!) 115 16 (!) 103 °F (39.4 °C) 98 %      MAP       --         Physical Exam    Nursing note and vitals reviewed.  Constitutional: He appears well-developed and well-nourished. He is not diaphoretic. No distress.   Well appearing     HENT:   Head: Normocephalic and atraumatic.   Right Ear: External ear normal.   Left Ear: External ear normal.   Mouth/Throat: Oropharynx is clear and moist. No oropharyngeal exudate.   Eyes: Conjunctivae and EOM are normal. Pupils are equal, round, and reactive to light. No scleral icterus.   Cardiovascular: Regular rhythm and intact distal pulses.   Mild tachycardia   T103F   Pulmonary/Chest: Breath sounds normal. No respiratory distress. He has no wheezes. He has no rhonchi. He has no rales.   Abdominal: Abdomen is soft. Bowel sounds are normal. There is no abdominal tenderness. There is no rebound and no guarding.   Musculoskeletal:         General: No edema. Normal range of motion.     Lymphadenopathy:     He has no cervical adenopathy.   Neurological: He is alert and oriented to person, place, and time.   Skin: Skin is warm and dry. Capillary refill takes less than 2 seconds.   Psychiatric: He has a normal mood and affect. His behavior is normal. Judgment and thought content normal.         ED Course   Procedures  Labs Reviewed   SARS-COV-2 RDRP GENE    Narrative:     This test utilizes isothermal nucleic acid amplification   technology to detect the SARS-CoV-2 RdRp nucleic acid segment.   The analytical sensitivity (limit of detection) is 125 genome   equivalents/mL.   A POSITIVE result implies infection with the SARS-CoV-2  virus;   the patient is presumed to be contagious.     A NEGATIVE result means that SARS-CoV-2 nucleic acids are not   present above the limit of detection. A NEGATIVE result should be   treated as presumptive. It does not rule out the possibility of   COVID-19 and should not be the sole basis for treatment decisions.   If COVID-19 is strongly suspected based on clinical and exposure   history, re-testing using an alternate molecular assay should be   considered.   This test is only for use under the Food and Drug   Administration s Emergency Use Authorization (EUA).   Commercial kits are provided by [employer].   Performance characteristics of the EUA have been independently   verified by [facility] Department of   Pathology and Laboratory Medicine.   _________________________________________________________________   The authorized Fact Sheet for Healthcare Providers and the authorized Fact   Sheet for Patients of the ID NOW COVID-19 are available on the FDA   website:     https://www.fda.gov/media/659518/download  https://www.fda.gov/media/278825/download       POCT INFLUENZA A/B MOLECULAR          Imaging Results    None          Medications   acetaminophen tablet 1,000 mg (1,000 mg Oral Given 1/7/22 1538)   ondansetron disintegrating tablet 4 mg (4 mg Oral Given 1/7/22 1538)     Medical Decision Making:   History:   Old Medical Records: I decided to obtain old medical records.  Old Records Summarized: records from clinic visits and records from previous admission(s).  Initial Assessment:   Patient presents for several days of viral syndrome symptoms- LTAB, mild tachycardia, BP stable, T103F    Differential Diagnosis:   Ddx includes Covid, Flu, viral syndrome. Physical exam and history taking lower clinical suspicion for PNA, sepsis, bacterial pharyngitis, bacterial sinusitis.     Clinical Tests:   Lab Tests: Ordered and Reviewed  ED Management:  Covid, flu negative. Given exposure and  symptoms, possible too early for testing/false negative covid test. Given appropriate precautions. Doubt bacterial/focal infection based on ROS and exam.   Tylenol/advil for fever, other conservative measures at home for suspected viral syndrome. Patient agreed to plan of care and voiced understanding. Discharged in stable condition with strict ED return precautions.    Jenifer Bhardwaj PA-C  01/07/2022               ED Course as of 01/07/22 2116 Fri Jan 07, 2022   1510 SpO2: 100 %  Ambulation   [MF]   1529 SARS-CoV-2 RNA, Amplification, Qual: Negative [MF]   1559 POCT Influenza A/B Molecular [MF]      ED Course User Index  [MF] Jenifer Bhardwaj PA-C             Clinical Impression:   Final diagnoses:  [B34.9] Viral syndrome (Primary)  [Z20.822] COVID-19 virus not detected          ED Disposition Condition    Discharge Stable        ED Prescriptions     Medication Sig Dispense Start Date End Date Auth. Provider    naproxen (NAPROSYN) 500 MG tablet Take 1 tablet (500 mg total) by mouth 2 (two) times daily as needed (pain). 10 tablet 1/7/2022  Jenifer Bhardwaj PA-C    ondansetron (ZOFRAN-ODT) 4 MG TbDL Take 1 tablet (4 mg total) by mouth every 8 (eight) hours as needed (nausea). 12 tablet 1/7/2022  Jenifer Bhardwaj PA-C        Follow-up Information    None          Jenifer Bhardwaj PA-C  01/07/22 2118

## 2022-09-22 ENCOUNTER — OCCUPATIONAL HEALTH (OUTPATIENT)
Dept: URGENT CARE | Facility: CLINIC | Age: 32
End: 2022-09-22

## 2022-09-22 DIAGNOSIS — Z13.9 ENCOUNTER FOR SCREENING: Primary | ICD-10-CM

## 2022-09-22 PROCEDURE — 99199 OCC MED MRO FEE: ICD-10-PCS | Mod: S$GLB,,, | Performed by: EMERGENCY MEDICINE

## 2022-09-22 PROCEDURE — 80305 OOH COLLECTION ONLY DRUG SCREEN: ICD-10-PCS | Mod: S$GLB,,, | Performed by: EMERGENCY MEDICINE

## 2022-09-22 PROCEDURE — 80305 DRUG TEST PRSMV DIR OPT OBS: CPT | Mod: S$GLB,,, | Performed by: EMERGENCY MEDICINE

## 2022-09-22 PROCEDURE — 99199 UNLISTED SPECIAL SVC PX/RPRT: CPT | Mod: S$GLB,,, | Performed by: EMERGENCY MEDICINE

## 2022-10-27 ENCOUNTER — HOSPITAL ENCOUNTER (EMERGENCY)
Facility: HOSPITAL | Age: 32
Discharge: HOME | End: 2022-10-27
Attending: EMERGENCY MEDICINE
Payer: COMMERCIAL

## 2022-10-27 VITALS
DIASTOLIC BLOOD PRESSURE: 62 MMHG | HEIGHT: 69 IN | HEART RATE: 90 BPM | BODY MASS INDEX: 22.22 KG/M2 | TEMPERATURE: 101 F | SYSTOLIC BLOOD PRESSURE: 114 MMHG | OXYGEN SATURATION: 98 % | RESPIRATION RATE: 18 BRPM | WEIGHT: 150 LBS

## 2022-10-27 DIAGNOSIS — U07.1 COVID-19: Primary | ICD-10-CM

## 2022-10-27 LAB
GROUP A STREP, MOLECULAR: NEGATIVE
HCV AB SERPL QL IA: NORMAL
HIV 1+2 AB+HIV1 P24 AG SERPL QL IA: NORMAL
INFLUENZA A, MOLECULAR: NEGATIVE
INFLUENZA B, MOLECULAR: NEGATIVE
SARS-COV-2 RDRP RESP QL NAA+PROBE: POSITIVE
SPECIMEN SOURCE: NORMAL

## 2022-10-27 PROCEDURE — 99284 EMERGENCY DEPT VISIT MOD MDM: CPT | Mod: 25

## 2022-10-27 PROCEDURE — 87389 HIV-1 AG W/HIV-1&-2 AB AG IA: CPT | Performed by: PHYSICIAN ASSISTANT

## 2022-10-27 PROCEDURE — 25000003 PHARM REV CODE 250: Performed by: PHYSICIAN ASSISTANT

## 2022-10-27 PROCEDURE — 99284 EMERGENCY DEPT VISIT MOD MDM: CPT | Mod: CR,CS,, | Performed by: EMERGENCY MEDICINE

## 2022-10-27 PROCEDURE — U0002 COVID-19 LAB TEST NON-CDC: HCPCS | Performed by: EMERGENCY MEDICINE

## 2022-10-27 PROCEDURE — 87651 STREP A DNA AMP PROBE: CPT | Performed by: EMERGENCY MEDICINE

## 2022-10-27 PROCEDURE — 86803 HEPATITIS C AB TEST: CPT | Performed by: PHYSICIAN ASSISTANT

## 2022-10-27 PROCEDURE — 99284 PR EMERGENCY DEPT VISIT,LEVEL IV: ICD-10-PCS | Mod: CR,CS,, | Performed by: EMERGENCY MEDICINE

## 2022-10-27 PROCEDURE — 87502 INFLUENZA DNA AMP PROBE: CPT | Performed by: EMERGENCY MEDICINE

## 2022-10-27 PROCEDURE — 96360 HYDRATION IV INFUSION INIT: CPT

## 2022-10-27 PROCEDURE — 25000003 PHARM REV CODE 250: Performed by: EMERGENCY MEDICINE

## 2022-10-27 RX ORDER — ACETAMINOPHEN 500 MG
1000 TABLET ORAL
Status: COMPLETED | OUTPATIENT
Start: 2022-10-27 | End: 2022-10-27

## 2022-10-27 RX ORDER — ONDANSETRON 4 MG/1
4 TABLET, ORALLY DISINTEGRATING ORAL EVERY 6 HOURS PRN
Qty: 15 TABLET | Refills: 0 | Status: SHIPPED | OUTPATIENT
Start: 2022-10-27

## 2022-10-27 RX ORDER — BENZONATATE 100 MG/1
100 CAPSULE ORAL 3 TIMES DAILY PRN
Qty: 20 CAPSULE | Refills: 0 | Status: SHIPPED | OUTPATIENT
Start: 2022-10-27 | End: 2022-11-06

## 2022-10-27 RX ADMIN — ACETAMINOPHEN 1000 MG: 500 TABLET ORAL at 12:10

## 2022-10-27 RX ADMIN — SODIUM CHLORIDE 1000 ML: 0.9 INJECTION, SOLUTION INTRAVENOUS at 01:10

## 2022-10-27 NOTE — DISCHARGE INSTRUCTIONS
Diagnosis: COVID-19    Your COVID-19 test is positive.  Starting today, please self isolate for 7 days.  You do not need to be retested after this.  Do not go out in public.  Please inform any close contacts that they have been exposed. I am prescribing an experimental anti virus medicine called Paxlovid which has been shown to decrease the length of illness and keep people out of the hospital. However, it can have some side effects, mostly nausea and vomiting.  It is still considered an experimental medicine. I am prescribing medicine for cough and nausea.  You might need to take both Tylenol and ibuprofen for fever and pain. You should take Tylenol as needed for pain up to 3 grams daily which is 6 of the 500 mg extra strength tablets.  You can take to 2400 mg daily of ibuprofen in addition to this.    Please schedule a follow-up appointment with your primary care doctor after you finish your quarantine.  If you start to have shortness of breath, chest pain, passing out or inability to hold down fluids please return to the emergency department.

## 2022-10-27 NOTE — Clinical Note
"Samson "Raúl Aguilera was seen and treated in our emergency department on 10/27/2022.     COVID-19 is present in our communities across the state. There is limited testing for COVID at this time, so not all patients can be tested. In this situation, your employee meets the following criteria:    Samson Aguilera has met the criteria for COVID-19 testing and has a POSITIVE result. He can return to work once they are asymptomatic for 24 hours without the use of fever reducing medications AND at least five days from the first positive result. A mask is recommended for 5 days post quarantine.     If you have any questions or concerns, or if I can be of further assistance, please do not hesitate to contact me.    Sincerely,             Terra Walker PA-C"

## 2022-10-27 NOTE — ED PROVIDER NOTES
Encounter Date: 10/27/2022       History     Chief Complaint   Patient presents with    Generalized Body Aches     C/o body aches with funny feeling in throat since yesterday. Thinks may be allergic to his dog. Airway patent.      32-year-old male with no significant past medical history presents for sore throat and body aches for 1 day.  He is concerned he may be allergic to his friend's dog.  He denies any palliating factors, did not take any medication prior to arrival.  Reports associated subjective fever, chills, nausea, vomiting and diarrhea.  He denies cough, chest pain, shortness of breath, abdominal pain, difficulty swallowing, voice changes or neck swelling.  He reports that someone he works with with sneezing, denies any other known sick contacts.  Recent international travel.  He is not vaccinated for COVID-19 or the flu.    Review of patient's allergies indicates:   Allergen Reactions    Shrimp Swelling     Past Medical History:   Diagnosis Date    STD (male)      Past Surgical History:   Procedure Laterality Date    NONE  10/17/2013     Family History   Problem Relation Age of Onset    Hypertension Father     Hypertension Paternal Grandmother     Stroke Paternal Grandfather     Diabetes Neg Hx     Cancer Neg Hx     Heart disease Neg Hx      Social History     Tobacco Use    Smoking status: Former    Smokeless tobacco: Never   Substance Use Topics    Alcohol use: No    Drug use: No     Review of Systems   Constitutional:  Positive for chills and fever.   HENT:  Positive for sore throat. Negative for trouble swallowing and voice change.    Respiratory:  Negative for cough and shortness of breath.    Cardiovascular:  Negative for chest pain.   Gastrointestinal:  Positive for diarrhea, nausea and vomiting. Negative for abdominal distention, abdominal pain, anal bleeding, blood in stool, constipation and rectal pain.   Genitourinary:  Negative for dysuria.   Musculoskeletal:  Negative for back pain, neck  pain and neck stiffness.   Skin:  Negative for rash.   Allergic/Immunologic: Negative for immunocompromised state.   Neurological:  Negative for weakness and headaches.   Hematological:  Does not bruise/bleed easily.     Physical Exam     Initial Vitals [10/27/22 1221]   BP Pulse Resp Temp SpO2   (!) 111/56 110 20 (!) 103 °F (39.4 °C) 98 %      MAP       --         Physical Exam    Nursing note and vitals reviewed.  Constitutional: He appears well-developed and well-nourished. He is not diaphoretic. No distress.   HENT:   Head: Normocephalic and atraumatic.   Nose: Nose normal.   Mouth/Throat: Uvula is midline and mucous membranes are normal. No trismus in the jaw. Posterior oropharyngeal erythema present. No oropharyngeal exudate, posterior oropharyngeal edema or tonsillar abscesses.   Eyes: EOM are normal. Pupils are equal, round, and reactive to light.   Neck: Neck supple.   Normal range of motion.  Cardiovascular:  Normal rate, regular rhythm, normal heart sounds and intact distal pulses.     Exam reveals no gallop and no friction rub.       No murmur heard.  Pulmonary/Chest: Breath sounds normal. No respiratory distress. He has no wheezes. He has no rhonchi. He has no rales. He exhibits no tenderness.   Musculoskeletal:         General: Normal range of motion.      Cervical back: Normal range of motion and neck supple.     Neurological: He is alert and oriented to person, place, and time.   Skin: Skin is warm and dry.   Psychiatric: He has a normal mood and affect.       ED Course   Procedures  Labs Reviewed   SARS-COV-2 RNA AMPLIFICATION, QUAL - Abnormal; Notable for the following components:       Result Value    SARS-CoV-2 RNA, Amplification, Qual Positive (*)     All other components within normal limits   INFLUENZA A & B BY MOLECULAR   GROUP A STREP, MOLECULAR   HIV 1 / 2 ANTIBODY    Narrative:     Release to patient->Immediate   HEPATITIS C ANTIBODY    Narrative:     Release to patient->Immediate           Imaging Results    None          Medications   acetaminophen tablet 1,000 mg (1,000 mg Oral Given 10/27/22 1248)   sodium chloride 0.9% bolus 1,000 mL (0 mLs Intravenous Stopped 10/27/22 1456)     Medical Decision Making:   History:   Old Medical Records: I decided to obtain old medical records.  Old Records Summarized: records from previous admission(s).       <> Summary of Records: Most recent ED visit in January for viral syndrome  Initial Assessment:   32-year-old male presenting for fever with sore throat nausea, vomiting, diarrhea and fever.  He is febrile at 103° F, appropriately tachycardic, /56.  Nontoxic appearing.  Differential Diagnosis:   Influenza  COVID-19  Strep pharyngitis   Dehydration   Although patient has some concerns for allergic etiology, I do not think this is likely given his high fever and no obvious allergic symptoms  Clinical Tests:   Lab Tests: Ordered and Reviewed  ED Management:  Will check flu, COVID, strep, give antipyretics, fluid bolus reassess.    Patient reports feeling better after therapy.  Fever improved.  COVID-19 test is positive.  No desaturation with ambulation.  Patient is unvaccinated, will discharge with paxlovid, medications for symptomatic treatment and instructed to self isolate, follow up with PCP return to the ED for worsening symptoms. Stressed the importance of follow-up, strict ED return precautions given.  Patient voiced understanding and is comfortable with discharge.            ED Course as of 10/27/22 1802   Thu Oct 27, 2022   1334 SARS-CoV-2 RNA, Amplification, Qual(!): Positive [CC]   1353 SpO2(S): 98 %  No desaturation with ambulation [CC]   1354 Influenza A, Molecular: Negative [CC]   1354 Influenza B, Molecular: Negative [CC]   1403 Group A Strep, Molecular: Negative [CC]      ED Course User Index  [CC] Terra Walker PA-C                 Clinical Impression:   Final diagnoses:  [U07.1] COVID-19 (Primary)      ED Disposition Condition     Discharge Stable          ED Prescriptions       Medication Sig Dispense Start Date End Date Auth. Provider    ondansetron (ZOFRAN-ODT) 4 MG TbDL Take 1 tablet (4 mg total) by mouth every 6 (six) hours as needed (Nausea). 15 tablet 10/27/2022 -- Terra Walker PA-C    benzonatate (TESSALON) 100 MG capsule Take 1 capsule (100 mg total) by mouth 3 (three) times daily as needed for Cough. 20 capsule 10/27/2022 11/6/2022 Terra Walker PA-C    nirmatrelvir-ritonavir 300 mg (150 mg x 2)-100 mg copackaged tablets (EUA) Take 3 tablets by mouth 2 (two) times daily for 5 days. Each dose contains 2 nirmatrelvir (pink tablets) and 1 ritonavir (white tablet). Take all 3 tablets together 30 tablet 10/27/2022 11/1/2022 Terra Walker PA-C          Follow-up Information       Follow up With Specialties Details Why Contact [facility] - Family Medicine Family Medicine Schedule an appointment as soon as possible for a visit in 2 weeks  [address] [facility] [address]  [phone]      [facility] Emergency Dept Emergency Medicine Go to  If symptoms worsen [id number]  [facility] [address] [phone]             Terra Walker PA-C  10/27/22 8416

## 2022-10-27 NOTE — FIRST PROVIDER EVALUATION
"Medical screening examination initiated.  I have conducted a focused provider triage encounter, findings are as follows:    Brief history of present illness:    Subjective fever yesterday  Scratchy throat  Body aches  No skin rash    Hx of asthma as a child    Vitals:    10/27/22 1221   BP: (!) 111/56   Pulse: 110   Resp: 20   Temp: (!) 103 °F (39.4 °C)   TempSrc: Oral   SpO2: 98%   Weight: 68 kg (150 lb)   Height: 5' 9" (1.753 m)       Pertinent physical exam:    Post oropharynx no edema  Normal voice no trismus  Ctab    Brief workup plan:  covid, influenza, tylenol, strep    Preliminary workup initiated; this workup will be continued and followed by the physician or advanced practice provider that is assigned to the patient when roomed.  "

## 2022-11-04 ENCOUNTER — OFFICE VISIT (OUTPATIENT)
Dept: PRIMARY CARE CLINIC | Facility: CLINIC | Age: 32
End: 2022-11-04
Payer: COMMERCIAL

## 2022-11-04 VITALS
DIASTOLIC BLOOD PRESSURE: 60 MMHG | OXYGEN SATURATION: 99 % | BODY MASS INDEX: 19.38 KG/M2 | SYSTOLIC BLOOD PRESSURE: 100 MMHG | WEIGHT: 138.44 LBS | RESPIRATION RATE: 18 BRPM | HEIGHT: 71 IN | HEART RATE: 88 BPM | TEMPERATURE: 99 F

## 2022-11-04 DIAGNOSIS — Z86.16 RESOLVED POST-COVID-19 SYNDROME: ICD-10-CM

## 2022-11-04 DIAGNOSIS — Z00.00 PREVENTATIVE HEALTH CARE: ICD-10-CM

## 2022-11-04 PROBLEM — U07.1 COVID-19 VIRUS RNA TEST RESULT POSITIVE AT LIMIT OF DETECTION: Status: ACTIVE | Noted: 2022-11-04

## 2022-11-04 PROBLEM — U07.1 COVID-19 VIRUS RNA TEST RESULT POSITIVE AT LIMIT OF DETECTION: Status: RESOLVED | Noted: 2022-11-04 | Resolved: 2022-11-04

## 2022-11-04 PROCEDURE — 99999 PR PBB SHADOW E&M-EST. PATIENT-LVL IV: ICD-10-PCS | Mod: PBBFAC,,, | Performed by: INTERNAL MEDICINE

## 2022-11-04 PROCEDURE — 3074F SYST BP LT 130 MM HG: CPT | Mod: CPTII,S$GLB,, | Performed by: INTERNAL MEDICINE

## 2022-11-04 PROCEDURE — 3074F PR MOST RECENT SYSTOLIC BLOOD PRESSURE < 130 MM HG: ICD-10-PCS | Mod: CPTII,S$GLB,, | Performed by: INTERNAL MEDICINE

## 2022-11-04 PROCEDURE — 3008F BODY MASS INDEX DOCD: CPT | Mod: CPTII,S$GLB,, | Performed by: INTERNAL MEDICINE

## 2022-11-04 PROCEDURE — 1159F PR MEDICATION LIST DOCUMENTED IN MEDICAL RECORD: ICD-10-PCS | Mod: CPTII,S$GLB,, | Performed by: INTERNAL MEDICINE

## 2022-11-04 PROCEDURE — 1159F MED LIST DOCD IN RCRD: CPT | Mod: CPTII,S$GLB,, | Performed by: INTERNAL MEDICINE

## 2022-11-04 PROCEDURE — 3008F PR BODY MASS INDEX (BMI) DOCUMENTED: ICD-10-PCS | Mod: CPTII,S$GLB,, | Performed by: INTERNAL MEDICINE

## 2022-11-04 PROCEDURE — 99999 PR PBB SHADOW E&M-EST. PATIENT-LVL IV: CPT | Mod: PBBFAC,,, | Performed by: INTERNAL MEDICINE

## 2022-11-04 PROCEDURE — 3078F DIAST BP <80 MM HG: CPT | Mod: CPTII,S$GLB,, | Performed by: INTERNAL MEDICINE

## 2022-11-04 PROCEDURE — 3078F PR MOST RECENT DIASTOLIC BLOOD PRESSURE < 80 MM HG: ICD-10-PCS | Mod: CPTII,S$GLB,, | Performed by: INTERNAL MEDICINE

## 2022-11-04 PROCEDURE — 99395 PREV VISIT EST AGE 18-39: CPT | Mod: 25,S$GLB,, | Performed by: INTERNAL MEDICINE

## 2022-11-04 PROCEDURE — 90471 FLU VACCINE (QUAD) GREATER THAN OR EQUAL TO 3YO PRESERVATIVE FREE IM: ICD-10-PCS | Mod: S$GLB,,, | Performed by: INTERNAL MEDICINE

## 2022-11-04 PROCEDURE — 99395 PR PREVENTIVE VISIT,EST,18-39: ICD-10-PCS | Mod: 25,S$GLB,, | Performed by: INTERNAL MEDICINE

## 2022-11-04 PROCEDURE — 90686 IIV4 VACC NO PRSV 0.5 ML IM: CPT | Mod: S$GLB,,, | Performed by: INTERNAL MEDICINE

## 2022-11-04 PROCEDURE — 90471 IMMUNIZATION ADMIN: CPT | Mod: S$GLB,,, | Performed by: INTERNAL MEDICINE

## 2022-11-04 PROCEDURE — 90686 FLU VACCINE (QUAD) GREATER THAN OR EQUAL TO 3YO PRESERVATIVE FREE IM: ICD-10-PCS | Mod: S$GLB,,, | Performed by: INTERNAL MEDICINE

## 2022-11-04 NOTE — ASSESSMENT & PLAN NOTE
-get flu shot today  -get COVID bivalent vaccine  -get routine labs with STD labs today  -NO unprotected sex  -make appt with [facility]

## 2022-11-04 NOTE — ASSESSMENT & PLAN NOTE
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

## 2022-11-04 NOTE — PROGRESS NOTES
[facility] Progress Note      Chief Complaint     Establish Care, Annual Exam, and COVID-19 Post Vaccine Symptoms   for follow-up COVID     Subjective:      History of Present Illness:  Samson Aguilera is a 32 y.o. 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  He initially presented with SOB and was prescribed tessalon perles and zofran for cough and nausea which have resolved.     PMH: 3/2020 +Gonorrhea presented with dysuria  FH: Dad HTN; mom HTN: PGF  of unknown cancer; PGM on HD  SH:  not always protected sex with condom; no tobacco, drugs, social alcohol; lives alone in [place]; has 2 bro and 4 sisters; dad lives in [place]; mom lives in [facility]   Diet: very healthy diet, more vegetarian with chicken   Past Medical History:  Past Medical History:   Diagnosis Date    STD (male)        Past Surgical History:  Past Surgical History:   Procedure Laterality Date    NONE  10/17/2013       Allergies:  Review of patient's allergies indicates:   Allergen Reactions    Shrimp Swelling       Home Medications:  Current Outpatient Medications   Medication Sig Dispense Refill    benzonatate (TESSALON) 100 MG capsule Take 1 capsule (100 mg total) by mouth 3 (three) times daily as needed for Cough. 20 capsule 0    ondansetron (ZOFRAN-ODT) 4 MG TbDL Take 1 tablet (4 mg total) by mouth every 6 (six) hours as needed (Nausea). 15 tablet 0     No current facility-administered medications for this visit.        Family History:  Family History   Problem Relation Age of Onset    Hypertension Father     Hypertension Paternal Grandmother     Stroke Paternal Grandfather     Diabetes Neg Hx     Cancer Neg Hx     Heart disease Neg Hx        Social History:  Social History     Tobacco Use    Smoking status: Former    Smokeless tobacco: Never  "  Substance Use Topics    Alcohol use: No    Drug use: No       Review of Systems:  Pertinent positives and negatives listed in HPI. All other systems are reviewed and are negative.    Health Maintaince :   Health Maintenance Topics with due status: Not Due       Topic Last Completion Date    TETANUS VACCINE 02/23/2019           Eye: UTD  Dental: needs appt    Immunizations:   Tdap: 2/2019 .  Influenza:  today .  COVID:  none  Hepatitis C: negative   Cancer Screening:    The ASCVD Risk score (Hui FIELDS, et al., 2019) failed to calculate for the following reasons:    The 2019 ASCVD risk score is only valid for ages 40 to 79      Objective:   /60 (BP Location: Right arm, Patient Position: Sitting, BP Method: Medium (Manual))   Pulse 88   Temp 99 °F (37.2 °C) (Oral)   Resp 18   Ht 5' 11" (1.803 m)   Wt 62.8 kg (138 lb 7.2 oz)   SpO2 99%   BMI 19.31 kg/m²      Body mass index is 19.31 kg/m².       Physical Examination:  General: Alert and awake in no apparent distress  HEENT: Normocephalic and atraumatic; Tms WNL  Eyes:  PERRL; EOMi with anicteric sclera and clear conjunctivae  Mouth:  Oropharynx clear and without exudate; moist mucous membranes  Neck:   Cervical nodes not enlarged; supple; no bruits  Cardio:  Regular rate and rhythm with normal S1 and S2; no murmurs or rubs  Resp:  CTAB; respirations unlabored; no wheezes, crackles or rhonchi  Abdom: Soft, NTND with normoactive bowel sounds; negative HSM  Extrem: Warm and well-perfused with no clubbing, cyanosis or edema  Skin:  No rashes, lesions, or color changes  Pulses:  2+ and symmetric distally  Neuro:  AAOx3; cooperative and pleasant with no focal deficits    Laboratory:      Most Recent Data:  Lab Results   Component Value Date    HCT 44 01/11/2018              CBC:   POC Hematocrit   Date Value Ref Range Status   01/11/2018 44 36 - 54 %PCV Final     BMP: No results found for: NA, K, CL, CO2, BUN, CREATININE, GLU, CALCIUM, MG, PHOS  LFTs: No " results found for: PROT, ALBUMIN, BILITOT, AST, ALKPHOS, ALT, GGT  Coags: No results found for: INR, PROTIME, PTT  FLP:    No results found for: CHOL  No results found for: HDL  No results found for: LDLCALC  No results found for: TRIG  No results found for: CHOLHDL   DM:    No results found for: HGBA1C, GLUF, MICROALBUR, LDLCALC, CREATININE  Thyroid: No results found for: TSH, FREET4, K5DKHAH, X0HYIEK, THYROIDAB  Anemia: No results found for: IRON, TIBC, FERRITIN, [id number], FOLATE  Cardiac: No results found for: TROPONINI, CKTOTAL, CKMB, BNP  Urinalysis: No results found for: LABURIN, COLORU, PHUA, CLARITYU, SPECGRAV, LABSPEC, NITRITE, PROTEINUR, GLUCOSEU, KETONESU, UROBILINOGEN, BILIRUBINUR, BLOODU, RBCU, WBCUA    Other Laboratory Data:      Other Results:  EKG (my interpretation):     Radiology:  X-Ray Hand 3 View Left  Narrative: EXAMINATION:  XR HAND COMPLETE 3 VIEW LEFT    CLINICAL HISTORY:  laceration of middle finger;.    TECHNIQUE:  PA, lateral, and oblique views of the left hand were performed.    COMPARISON:  None.    FINDINGS:  No evidence of acute fracture or dislocation.  Bandage material surrounds the 3rd digit.  No distinct radiopaque foreign body.  Soft tissues are relatively symmetric.  Impression: No acute bony abnormality.    Electronically signed by: Rex Pickett MD  Date:    02/23/2019  Time:    02:23          Assessment/Plan     Samson Aguilera is a 32 y.o. male with:  1. Preventative health care  Assessment & Plan:  -get flu shot today  -get COVID bivalent vaccine  -get routine labs with STD labs today  -NO unprotected sex  -make appt with [facility]       2. 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              I spent a total of 30 minutes on the day of the visit.This includes  face to face time and non-face to face time preparing to see the patient (eg, review of tests), obtaining and/or reviewing separately obtained history, documenting clinical information in the electronic or other health record, independently interpreting results and communicating results to the patient/family/caregiver, or care coordinator.   Code Status:     Merle Vivar MD

## 2022-11-04 NOTE — PATIENT INSTRUCTIONS
get flu shot today  -get COVID bivalent vaccine  -get routine labs with STD labs today  -NO unprotected sex  -make appt with [facility]

## 2023-01-25 ENCOUNTER — OFFICE VISIT (OUTPATIENT)
Dept: URGENT CARE | Facility: CLINIC | Age: 33
End: 2023-01-25
Payer: COMMERCIAL

## 2023-01-25 VITALS
BODY MASS INDEX: 21 KG/M2 | WEIGHT: 150 LBS | TEMPERATURE: 98 F | RESPIRATION RATE: 18 BRPM | SYSTOLIC BLOOD PRESSURE: 123 MMHG | HEIGHT: 71 IN | DIASTOLIC BLOOD PRESSURE: 76 MMHG | OXYGEN SATURATION: 98 % | HEART RATE: 69 BPM

## 2023-01-25 DIAGNOSIS — Z72.51 RISK FOR SEXUALLY TRANSMITTED DISEASE: Primary | ICD-10-CM

## 2023-01-25 PROCEDURE — 87591 N.GONORRHOEAE DNA AMP PROB: CPT | Performed by: NURSE PRACTITIONER

## 2023-01-25 PROCEDURE — 87661 TRICHOMONAS VAGINALIS AMPLIF: CPT | Performed by: NURSE PRACTITIONER

## 2023-01-25 PROCEDURE — 3078F PR MOST RECENT DIASTOLIC BLOOD PRESSURE < 80 MM HG: ICD-10-PCS | Mod: CPTII,S$GLB,, | Performed by: NURSE PRACTITIONER

## 2023-01-25 PROCEDURE — 87340 HEPATITIS B SURFACE AG IA: CPT | Performed by: NURSE PRACTITIONER

## 2023-01-25 PROCEDURE — 1159F MED LIST DOCD IN RCRD: CPT | Mod: CPTII,S$GLB,, | Performed by: NURSE PRACTITIONER

## 2023-01-25 PROCEDURE — 3074F SYST BP LT 130 MM HG: CPT | Mod: CPTII,S$GLB,, | Performed by: NURSE PRACTITIONER

## 2023-01-25 PROCEDURE — 1160F RVW MEDS BY RX/DR IN RCRD: CPT | Mod: CPTII,S$GLB,, | Performed by: NURSE PRACTITIONER

## 2023-01-25 PROCEDURE — 1160F PR REVIEW ALL MEDS BY PRESCRIBER/CLIN PHARMACIST DOCUMENTED: ICD-10-PCS | Mod: CPTII,S$GLB,, | Performed by: NURSE PRACTITIONER

## 2023-01-25 PROCEDURE — 36415 COLL VENOUS BLD VENIPUNCTURE: CPT | Performed by: NURSE PRACTITIONER

## 2023-01-25 PROCEDURE — 3008F BODY MASS INDEX DOCD: CPT | Mod: CPTII,S$GLB,, | Performed by: NURSE PRACTITIONER

## 2023-01-25 PROCEDURE — 3074F PR MOST RECENT SYSTOLIC BLOOD PRESSURE < 130 MM HG: ICD-10-PCS | Mod: CPTII,S$GLB,, | Performed by: NURSE PRACTITIONER

## 2023-01-25 PROCEDURE — 99213 PR OFFICE/OUTPT VISIT, EST, LEVL III, 20-29 MIN: ICD-10-PCS | Mod: S$GLB,,, | Performed by: NURSE PRACTITIONER

## 2023-01-25 PROCEDURE — 86592 SYPHILIS TEST NON-TREP QUAL: CPT | Performed by: NURSE PRACTITIONER

## 2023-01-25 PROCEDURE — 87389 HIV-1 AG W/HIV-1&-2 AB AG IA: CPT | Performed by: NURSE PRACTITIONER

## 2023-01-25 PROCEDURE — 3008F PR BODY MASS INDEX (BMI) DOCUMENTED: ICD-10-PCS | Mod: CPTII,S$GLB,, | Performed by: NURSE PRACTITIONER

## 2023-01-25 PROCEDURE — 86803 HEPATITIS C AB TEST: CPT | Performed by: NURSE PRACTITIONER

## 2023-01-25 PROCEDURE — 3078F DIAST BP <80 MM HG: CPT | Mod: CPTII,S$GLB,, | Performed by: NURSE PRACTITIONER

## 2023-01-25 PROCEDURE — 99213 OFFICE O/P EST LOW 20 MIN: CPT | Mod: S$GLB,,, | Performed by: NURSE PRACTITIONER

## 2023-01-25 PROCEDURE — 1159F PR MEDICATION LIST DOCUMENTED IN MEDICAL RECORD: ICD-10-PCS | Mod: CPTII,S$GLB,, | Performed by: NURSE PRACTITIONER

## 2023-01-26 LAB
HBV SURFACE AG SERPL QL IA: NORMAL
HCV AB SERPL QL IA: NORMAL
HIV 1+2 AB+HIV1 P24 AG SERPL QL IA: NORMAL

## 2023-01-26 NOTE — PATIENT INSTRUCTIONS
Practice safe sex with condoms    If you have been exposed to an STI, please avoid sex until you know your results or negative; or until one week after being treated-if any results come back positive.     Test results should be back in 2-3 days. You can sign up for to receive your results on the [facility] portal, but we will call you with your results, as well,    If a test comes back positive and if you require treatment, we will call medicine into your pharmacy.

## 2023-01-26 NOTE — PROGRESS NOTES
"Subjective:       Patient ID: Samson Aguilera is a 32 y.o. male.    Vitals:  height is 5' 11" (1.803 m) and weight is 68 kg (150 lb). His temporal temperature is 98 °F (36.7 °C). His blood pressure is 123/76 and his pulse is 69. His respiration is 18 and oxygen saturation is 98%.     Chief Complaint: STI check up    This is a 32 y.o. male who presents to  today with request for STI testing. Pt denies any symptoms, and was not told that he was exposed to an STI. He just wants to get checked to make sure he is infection-free.     Exposure to STD  The patient's pertinent negatives include no pelvic pain, penile discharge, penile pain, scrotal swelling or testicular pain. This is a recurrent problem. The current episode started today. The problem occurs constantly. The problem has been unchanged. The patient is experiencing no pain. Pertinent negatives include no abdominal pain, dysuria, flank pain or frequency. Nothing aggravates the symptoms. He has tried nothing for the symptoms. The treatment provided no relief. He is sexually active. He consistently uses condoms. It is unknown whether or not his partner has an STD. His past medical history is significant for chlamydia and gonorrhea.     Gastrointestinal:  Negative for abdominal pain.   Genitourinary:  Negative for dysuria, frequency, flank pain, hematuria, painful ejaculation, genital sore, penile discharge, painful ejaculation, penile pain, penile swelling, scrotal swelling, testicular pain and pelvic pain.     Objective:      Physical Exam   Constitutional: He is oriented to person, place, and time.  Non-toxic appearance. He does not appear ill. No distress.   HENT:   Head: Normocephalic.   Nose: Nose normal.   Mouth/Throat: Mucous membranes are moist.   Neck: Neck supple. No neck rigidity present.   Cardiovascular: Normal rate.   Pulmonary/Chest: Effort normal.   Abdominal: Normal appearance.   Musculoskeletal: Normal range of motion.         " General: Normal range of motion.   Neurological: He is alert and oriented to person, place, and time.   Skin: Skin is not diaphoretic.   Psychiatric: His behavior is normal. Mood normal.   Nursing note and vitals reviewed.      Assessment:       1. Risk for sexually transmitted disease          Plan:       Will hold off on treatment until results are in.   Risk for sexually transmitted disease  -     C. trachomatis/N. gonorrhoeae by AMP DNA [facility]; Urine  -     Trichomonas vaginalis, RNA, Qual, Urine  -     HIV 1/2 Ag/Ab (4th Gen)  -     RPR  -     HEPATITIS C ANTIBODY  -     HEPATITIS B SURFACE ANTIGEN      Patient Instructions   Practice safe sex with condoms    If you have been exposed to an STI, please avoid sex until you know your results or negative; or until one week after being treated-if any results come back positive.     Test results should be back in 2-3 days. You can sign up for to receive your results on the [facility] portal, but we will call you with your results, as well,    If a test comes back positive and if you require treatment, we will call medicine into your pharmacy.

## 2023-01-27 LAB — RPR SER QL: NORMAL

## 2023-01-28 ENCOUNTER — PATIENT MESSAGE (OUTPATIENT)
Dept: PRIMARY CARE CLINIC | Facility: CLINIC | Age: 33
End: 2023-01-28
Payer: COMMERCIAL

## 2023-01-28 LAB
C TRACH DNA SPEC QL NAA+PROBE: NOT DETECTED
N GONORRHOEA DNA SPEC QL NAA+PROBE: NOT DETECTED

## 2023-01-30 LAB
T VAGINALIS RRNA SPEC QL NAA+PROBE: NOT DETECTED
TRICHOMONAS VAGINALIS RNA, QUAL, SOURCE: NORMAL

## 2023-01-31 ENCOUNTER — TELEPHONE (OUTPATIENT)
Dept: URGENT CARE | Facility: CLINIC | Age: 33
End: 2023-01-31
Payer: COMMERCIAL

## 2023-02-06 PROBLEM — Z00.00 PREVENTATIVE HEALTH CARE: Status: RESOLVED | Noted: 2022-11-04 | Resolved: 2023-02-06

## 2023-06-09 ENCOUNTER — PATIENT MESSAGE (OUTPATIENT)
Dept: RESEARCH | Facility: HOSPITAL | Age: 33
End: 2023-06-09
Payer: COMMERCIAL

## 2023-09-11 ENCOUNTER — HOSPITAL ENCOUNTER (EMERGENCY)
Facility: HOSPITAL | Age: 33
Discharge: HOME | End: 2023-09-11
Attending: EMERGENCY MEDICINE
Payer: COMMERCIAL

## 2023-09-11 VITALS
WEIGHT: 150 LBS | BODY MASS INDEX: 20.92 KG/M2 | TEMPERATURE: 98 F | SYSTOLIC BLOOD PRESSURE: 111 MMHG | DIASTOLIC BLOOD PRESSURE: 67 MMHG | HEART RATE: 82 BPM | OXYGEN SATURATION: 99 % | RESPIRATION RATE: 18 BRPM

## 2023-09-11 DIAGNOSIS — R21 SKIN RASH: Primary | ICD-10-CM

## 2023-09-11 PROCEDURE — 99283 EMERGENCY DEPT VISIT LOW MDM: CPT

## 2023-09-11 RX ORDER — HYDROCORTISONE 25 MG/G
CREAM TOPICAL EVERY 8 HOURS
Qty: 20 G | Refills: 0 | Status: SHIPPED | OUTPATIENT
Start: 2023-09-11 | End: 2023-10-21 | Stop reason: SDUPTHER

## 2023-09-11 RX ORDER — HYDROCORTISONE 25 MG/G
CREAM TOPICAL EVERY 8 HOURS
Qty: 20 G | Refills: 0 | OUTPATIENT
Start: 2023-09-11 | End: 2023-09-11 | Stop reason: SDUPTHER

## 2023-09-11 NOTE — FIRST PROVIDER EVALUATION
Medical screening examination initiated.  I have conducted a focused provider triage encounter, findings are as follows:    Brief history of present illness:  Rash    Vitals:    09/11/23 1516   BP: 132/67   BP Location: Right arm   Patient Position: Sitting   Pulse: 86   Resp: 18   Temp: 99.4 °F (37.4 °C)   TempSrc: Oral   SpO2: 98%   Weight: 68 kg (150 lb)       Pertinent physical exam:  Nonspecific rash    Brief workup plan:  Patient will receive further evaluation in the ED    Preliminary workup initiated; this workup will be continued and followed by the physician or advanced practice provider that is assigned to the patient when roomed.

## 2023-09-11 NOTE — DISCHARGE INSTRUCTIONS
Apply steroid cream every 8 hours for the next 5 days or until rash improves.   Take ibuprofen 600-800 mg every 6 hours as needed with food for anti-inflammatory relief.  You can take acetaminophen/tylenol 650 mg every 6 hours or 1000 mg every 8 hours for added relief.  Continue OTC benadryl for itch relief.  Follow up with PCP.  Return to the ER for new or worsening symptoms.

## 2023-09-11 NOTE — ED TRIAGE NOTES
"Two days ago pt reports " red spots" on bilateral arms and back of neck but have since gone down  "

## 2023-09-11 NOTE — ED NOTES
Patient identifiers verified and correct for Connorarianne Hammondyaw  LOC: The patient is awake, alert and aware of environment with an appropriate affect, the patient is oriented x 3 and speaking appropriately.   APPEARANCE: Patient appears comfortable and in no acute distress, patient is clean and well groomed.  SKIN: The skin is warm and dry, color consistent with ethnicity, patient has normal skin turgor and moist mucus membranes, skin intact, no breakdown or bruising noted.   MUSCULOSKELETAL: Patient moving all extremities spontaneously, no swelling noted.  RESPIRATORY: Airway is open and patent, respirations are spontaneous, patient has a normal effort and rate, no accessory muscle use noted, O2 sats noted at 98% on room air.  CARDIAC: Patient has a normal rate and regular rhythm, no edema noted, capillary refill < 3 seconds.   GASTRO: Soft and non tender to palpation, no distention noted, normoactive bowel sounds present in all four quadrants. Pt states bowel movements have been regular.  : Pt denies any pain or frequency with urination.  NEURO: Pt opens eyes spontaneously, behavior appropriate to situation, follows commands, facial expression symmetrical, bilateral hand grasp equal and even, purposeful motor response noted, normal sensation in all extremities when touched with a finger.

## 2023-09-11 NOTE — Clinical Note
"Samson Godinezdomenico Aguilera was seen and treated in our emergency department on 9/11/2023.  He may return to work on 09/12/2023.       If you have any questions or concerns, please don't hesitate to call.      Alina Dietz PA-C"

## 2023-09-12 NOTE — ED PROVIDER NOTES
"Encounter Date: 9/11/2023       History     Chief Complaint   Patient presents with    Skin Irritation     Denies rash, hives, itching, burning.      Patient is a 33-year-old male who presents the [facility] ED with rash.    Patient states he noted it yesterday, but it has "cleared".  He reports improvement in his rash.  It is itchy.  He noted one each to his forearm, some on his posterior neck and left thoracic back.  He took an oral Benadryl with relief.  He is unsure if he was bit by anything. States he just moved into a new place. Lives alone.  He denies any new personal hygiene products.  No previous history of rash.  No allergies.  He denies any difficulty swallowing or shortness a breath.        Review of patient's allergies indicates:   Allergen Reactions    Shrimp Swelling     Past Medical History:   Diagnosis Date    STD (male)      Past Surgical History:   Procedure Laterality Date    NONE  10/17/2013     Family History   Problem Relation Age of Onset    Hypertension Father     Hypertension Paternal Grandmother     Stroke Paternal Grandfather     Diabetes Neg Hx     Cancer Neg Hx     Heart disease Neg Hx      Social History     Tobacco Use    Smoking status: Former    Smokeless tobacco: Never   Substance Use Topics    Alcohol use: No    Drug use: No     Review of Systems   Constitutional:  Negative for fever.   HENT:  Negative for sore throat.    Respiratory:  Negative for shortness of breath.    Cardiovascular:  Negative for chest pain.   Gastrointestinal:  Negative for nausea.   Genitourinary:  Negative for dysuria.   Musculoskeletal:  Negative for back pain.   Skin:  Positive for rash.   Neurological:  Negative for weakness.   Hematological:  Does not bruise/bleed easily.       Physical Exam     Initial Vitals [09/11/23 1516]   BP Pulse Resp Temp SpO2   132/67 86 18 99.4 °F (37.4 °C) 98 %      MAP       --         Physical Exam    Vitals reviewed.  Constitutional: He appears well-developed and well-nourished. " He is not diaphoretic. He is cooperative.  Non-toxic appearance. He does not have a sickly appearance. He does not appear ill. No distress.   HENT:   Head: Normocephalic and atraumatic.   Nose: Nose normal.   Mouth/Throat: No trismus in the jaw.   No angioedema or tongue swelling. Tolerating saliva.    Eyes: Conjunctivae and EOM are normal.   Neck:   Normal range of motion.  Pulmonary/Chest: No accessory muscle usage. No tachypnea. No respiratory distress.   Abdominal: He exhibits no distension.   Musculoskeletal:         General: Normal range of motion.      Cervical back: Normal range of motion.     Neurological: He is alert. He has normal strength.   Skin: Skin is dry. No pallor.   Isolated raised, slightly erythematous lesions to his anterior forearm, posterior neck, and thoracic back. Some excoriations noted to the ones on neck. No fluctuance. No surrounding erythema.          ED Course   Procedures  Labs Reviewed - No data to display       Imaging Results    None          Medications - No data to display  Medical Decision Making  Patient is a 33-year-old male who presents the [facility] ED with rash.    Differential diagnosis includes but is not limited to bug bites, local reaction, allergies, urticaria.  No signs of anaphylaxis, cellulitis, or abscess.     Patient with a reassuring physical exam.  He notes his lesions/rash or itchy.  He does not need any emergent labs or imaging at this time as it is of little utility.  It could be bedbugs bite since the lesions or linear to one another.  Also could be other insect bite.  Regardless, the treatment is the same for now.  Will prescribe hydrocortisone steroid cream.  Continue oral Benadryl.  I recommend he cleaned his home and look for insects.  Follow-up if symptoms do not improve.  All of his questions were answered.  Patient comfortable with plan and stable for discharge.    Risk  Prescription drug management.               ED Course as of 09/11/23 1927 Mon Sep  11, 2023   1637 BP: 111/67 [CL]   1637 Temp: 98.3 °F (36.8 °C) [CL]   1637 Pulse: 82 [CL]   1637 Resp: 18 [CL]   1637 SpO2: 99 % [CL]      ED Course User Index  [CL] Alina Dietz PA-C                    Clinical Impression:   Final diagnoses:  [R21] Skin rash (Primary)        ED Disposition Condition    Discharge Stable          ED Prescriptions       Medication Sig Dispense Start Date End Date Auth. Provider             hydrocortisone 2.5 % cream Apply topically every 8 (eight) hours. 20 g 9/11/2023 -- Alina Dietz PA-C          Follow-up Information       Follow up With Specialties Details Why Contact Info Additional Information    [facility] Internal Medicine Schedule an appointment as soon as possible for a visit   [address]  [facility] [id number] [phone] [facility] Please park in surface lot and check in at central registration desk    [facility] Emergency Medicine  If symptoms worsen [address]  [facility] [phone]              Alina Dietz PA-C  09/11/23 1928

## 2023-10-21 ENCOUNTER — HOSPITAL ENCOUNTER (EMERGENCY)
Facility: HOSPITAL | Age: 33
Discharge: HOME | End: 2023-10-21
Attending: EMERGENCY MEDICINE
Payer: COMMERCIAL

## 2023-10-21 VITALS
SYSTOLIC BLOOD PRESSURE: 126 MMHG | WEIGHT: 150 LBS | HEART RATE: 78 BPM | RESPIRATION RATE: 15 BRPM | TEMPERATURE: 98 F | DIASTOLIC BLOOD PRESSURE: 78 MMHG | OXYGEN SATURATION: 99 % | BODY MASS INDEX: 20.92 KG/M2

## 2023-10-21 DIAGNOSIS — R21 RASH AND NONSPECIFIC SKIN ERUPTION: Primary | ICD-10-CM

## 2023-10-21 PROCEDURE — 99283 EMERGENCY DEPT VISIT LOW MDM: CPT

## 2023-10-21 RX ORDER — HYDROCORTISONE 25 MG/G
CREAM TOPICAL EVERY 8 HOURS
Qty: 20 G | Refills: 0 | Status: SHIPPED | OUTPATIENT
Start: 2023-10-21

## 2023-10-22 NOTE — ED TRIAGE NOTES
"Samson Aguilera, a 33 y.o. male presents to the ED w/ complaint of rash on right arm, left thigh and across shoulder blades.  Denies pain, itching, or burning.  States "it tingles." Seen recently but states this time is different.  States has been using new detergent. And attempted to use cortisone cream at home but didn't notice a big difference.    Triage note:  Chief Complaint   Patient presents with    Rash     C/o rash on back of neck and left thigh.      Review of patient's allergies indicates:   Allergen Reactions    Shrimp Swelling     Past Medical History:   Diagnosis Date    STD (male)        "

## 2023-10-22 NOTE — ED PROVIDER NOTES
Encounter Date: 10/21/2023       History     Chief Complaint   Patient presents with    Rash     C/o rash on back of neck and left thigh.      33-year-old male with no significant past medical history presents for rash on his arm, posterior neck and leg x 2 days.  Obvious provoking factors, no new soaps, detergents, lotions.  He checked his extensively for bedbugs.  He lives alone.  He does not have any pets.  Rash feels tingling but does not itch or burn.  He denies fever, on palms or soles, joint swelling or other complaint.  No shortness of breath or facial swelling.      Review of patient's allergies indicates:   Allergen Reactions    Shrimp Swelling     Past Medical History:   Diagnosis Date    STD (male)      Past Surgical History:   Procedure Laterality Date    NONE  10/17/2013     Family History   Problem Relation Age of Onset    Hypertension Father     Hypertension Paternal Grandmother     Stroke Paternal Grandfather     Diabetes Neg Hx     Cancer Neg Hx     Heart disease Neg Hx      Social History     Tobacco Use    Smoking status: Former    Smokeless tobacco: Never   Substance Use Topics    Alcohol use: No    Drug use: No     Review of Systems    Physical Exam     Initial Vitals [10/21/23 1948]   BP Pulse Resp Temp SpO2   126/78 78 15 98.2 °F (36.8 °C) 99 %      MAP       --         Physical Exam    Nursing note and vitals reviewed.  Constitutional: He appears well-developed and well-nourished.   Cardiovascular:  Normal rate, regular rhythm, normal heart sounds and intact distal pulses.           Pulmonary/Chest: Breath sounds normal.     Skin: Skin is warm and dry. Rash noted.   Tiny raised papules on right arm, posterior neck.  No discharge or bleeding.  No palmar involvement. No finger web involvement               ED Course   Procedures  Labs Reviewed - No data to display       Imaging Results    None          Medications - No data to display  Medical Decision Making  33-year-old male presenting for a  rash.  His vitals are normal and he is well-appearing.    Differential diagnosis:  Insect bite   Consider scabies but he has no finger with involvement in the rash is not itchy so I think this is unlikely   Dermatitis   Does not appear allergic    Will discharge with hydrocortisone cream and instructions to follow up with PCP and return to the ED for worsening symptoms. Stressed the importance of follow-up, strict ED return precautions given.  Patient voiced understanding and is comfortable with discharge.     Risk  Prescription drug management.                               Clinical Impression:   Final diagnoses:  [R21] Rash and nonspecific skin eruption (Primary)        ED Disposition Condition    Discharge Stable          ED Prescriptions       Medication Sig Dispense Start Date End Date Auth. Provider    hydrocortisone 2.5 % cream Apply topically every 8 (eight) hours. 20 g 10/21/2023 -- Terra Walker PA-C          Follow-up Information       Follow up With Specialties Details Why Contact Info Additional Information    [facility] Internal Medicine Schedule an appointment as soon as possible for a visit in 1 week  [address]  [facility] [phone] [facility] Please park in surface lot and check in at central registration desk    [facility] Dept Emergency Medicine Go to   [address]  [facility] [id number] [phone]              Terra Walker PA-C  10/21/23 2010

## 2023-10-22 NOTE — DISCHARGE INSTRUCTIONS
Diagnosis: Rash    I am not sure what is causing your rash.  I think it could be due to insect bites.  I am prescribing a steroid cream to help with itching.  You should house to ensure that you do not have bedbugs.    Please schedule an appointment with your primary care doctor for follow-up. If you start to have any new or worsening symptoms, please come back to the emergency department.

## 2023-10-27 ENCOUNTER — OFFICE VISIT (OUTPATIENT)
Dept: DERMATOLOGY | Facility: CLINIC | Age: 33
End: 2023-10-27
Payer: COMMERCIAL

## 2023-10-27 DIAGNOSIS — B86 SCABIES: Primary | ICD-10-CM

## 2023-10-27 PROCEDURE — 1159F PR MEDICATION LIST DOCUMENTED IN MEDICAL RECORD: ICD-10-PCS | Mod: CPTII,S$GLB,, | Performed by: STUDENT IN AN ORGANIZED HEALTH CARE EDUCATION/TRAINING PROGRAM

## 2023-10-27 PROCEDURE — 99999 PR PBB SHADOW E&M-EST. PATIENT-LVL III: CPT | Mod: PBBFAC,,, | Performed by: STUDENT IN AN ORGANIZED HEALTH CARE EDUCATION/TRAINING PROGRAM

## 2023-10-27 PROCEDURE — 99999 PR PBB SHADOW E&M-EST. PATIENT-LVL III: ICD-10-PCS | Mod: PBBFAC,,, | Performed by: STUDENT IN AN ORGANIZED HEALTH CARE EDUCATION/TRAINING PROGRAM

## 2023-10-27 PROCEDURE — 99203 PR OFFICE/OUTPT VISIT, NEW, LEVL III, 30-44 MIN: ICD-10-PCS | Mod: S$GLB,,, | Performed by: STUDENT IN AN ORGANIZED HEALTH CARE EDUCATION/TRAINING PROGRAM

## 2023-10-27 PROCEDURE — 1159F MED LIST DOCD IN RCRD: CPT | Mod: CPTII,S$GLB,, | Performed by: STUDENT IN AN ORGANIZED HEALTH CARE EDUCATION/TRAINING PROGRAM

## 2023-10-27 PROCEDURE — 1160F PR REVIEW ALL MEDS BY PRESCRIBER/CLIN PHARMACIST DOCUMENTED: ICD-10-PCS | Mod: CPTII,S$GLB,, | Performed by: STUDENT IN AN ORGANIZED HEALTH CARE EDUCATION/TRAINING PROGRAM

## 2023-10-27 PROCEDURE — 1160F RVW MEDS BY RX/DR IN RCRD: CPT | Mod: CPTII,S$GLB,, | Performed by: STUDENT IN AN ORGANIZED HEALTH CARE EDUCATION/TRAINING PROGRAM

## 2023-10-27 PROCEDURE — 99203 OFFICE O/P NEW LOW 30 MIN: CPT | Mod: S$GLB,,, | Performed by: STUDENT IN AN ORGANIZED HEALTH CARE EDUCATION/TRAINING PROGRAM

## 2023-10-27 RX ORDER — PERMETHRIN 50 MG/G
CREAM TOPICAL
Qty: 60 G | Refills: 1 | Status: SHIPPED | OUTPATIENT
Start: 2023-10-27 | End: 2023-10-27

## 2023-10-27 RX ORDER — IVERMECTIN 3 MG/1
TABLET ORAL
Qty: 8 TABLET | Refills: 0 | Status: SHIPPED | OUTPATIENT
Start: 2023-10-27

## 2023-10-27 RX ORDER — IVERMECTIN 3 MG/1
TABLET ORAL
Qty: 1 TABLET | Refills: 0 | Status: SHIPPED | OUTPATIENT
Start: 2023-10-27 | End: 2023-10-27

## 2023-10-27 RX ORDER — PERMETHRIN 50 MG/G
CREAM TOPICAL
Qty: 60 G | Refills: 1 | Status: SHIPPED | OUTPATIENT
Start: 2023-10-27

## 2023-10-27 NOTE — PROGRESS NOTES
Subjective:      Patient ID:  Samson Aguilera is a 33 y.o. male who presents for   Chief Complaint   Patient presents with    Rash     Rt arm     34 yo M presents for rash on right arm.    Rash started about 2 weeks ago  Presented to ED on 10/21/23 for rash on arm, neck and leg for 2 days.  New laundry detergent, otherwise No new personal care products.  No new medications.  No pets.   Rash mildly itching, not painful, itch much worse at night  No fever, chills, palm or sole involvement.   They sent HC 2.5% cream, he tried and he thinks it made worse  Has tried OTC lotions     Review of Systems   Skin:  Negative for daily sunscreen use, activity-related sunscreen use and wears hat.   Hematologic/Lymphatic: Bruises/bleeds easily (bruise).     Objective:   Physical Exam   Constitutional: He appears well-developed and well-nourished. No distress.   Neurological: He is alert and oriented to person, place, and time. He is not disoriented.   Psychiatric: He has a normal mood and affect.      Diagram Legend     Erythematous scaling macule/papule c/w actinic keratosis       Vascular papule c/w angioma      Pigmented verrucoid papule/plaque c/w seborrheic keratosis      Yellow umbilicated papule c/w sebaceous hyperplasia      Irregularly shaped tan macule c/w lentigo     1-2 mm smooth white papules consistent with Milia      Movable subcutaneous cyst with punctum c/w epidermal inclusion cyst      Subcutaneous movable cyst c/w pilar cyst      Firm pink to brown papule c/w dermatofibroma      Pedunculated fleshy papule(s) c/w skin tag(s)      Evenly pigmented macule c/w junctional nevus     Mildly variegated pigmented, slightly irregular-bordered macule c/w mildly atypical nevus      Flesh colored to evenly pigmented papule c/w intradermal nevus       Pink pearly papule/plaque c/w basal cell carcinoma      Erythematous hyperkeratotic cursted plaque c/w SCC      Surgical scar with no sign of skin cancer  recurrence      Open and closed comedones      Inflammatory papules and pustules      Verrucoid papule consistent consistent with wart     Erythematous eczematous patches and plaques     Dystrophic onycholytic nail with subungual debris c/w onychomycosis     Umbilicated papule    Erythematous-base heme-crusted tan verrucoid plaque consistent with inflamed seborrheic keratosis     Erythematous Silvery Scaling Plaque c/w Psoriasis     See annotation      Assessment / Plan:     Scabies  Excoriated papules and erythematous edematous papules on wrists, arms, neck, a few on back and thighs, evidence of burrows. Evidence of scratching.  Favor scabies    This is caused by a mite. It can also cause a hypersensitivity reaction that creates an itchy rash. The itch is typically worse at night.    Start permethrin cream, apply neck down at night sleep overnight and wash off in morning. In the morning wash all of your bedding and clothes on high heat. Repeat this 1 week later (apply cream again, same process)    Start ivermectin take 4 pills (12 mg) on day 0. 2 weeks later, take 4 pills.     Avoid close contact with people. No sexual contact until cleared.    The itching and rash can take up to 1 month to resolve after treating the mites. Can use OTC anti-itch lotion (Sarna pramoxine anti-itch, CeraVe anti-itch lotion)    RTC 1 month, sooner prn

## 2023-10-27 NOTE — PATIENT INSTRUCTIONS
Scabies  This is caused by a mite. It can also cause a hypersensitivity reaction that creates an itchy rash. The itch is typically worse at night.    Start permethrin cream, apply neck down at night sleep overnight and wash off in morning. In the morning wash all of your bedding and clothes on high heat. Repeat this 1 week later (apply cream again, same process)    Start ivermectin take 4 pills (12 mg) on day 0. 2 weeks later, take 4 pills.     Avoid close contact with people. No sexual contact until cleared.    The itching and rash can take up to 1 month to resolve after treating the mites. Can use OTC anti-itch lotion (Sarna pramoxine anti-itch, CeraVe anti-itch lotion)

## 2024-05-20 ENCOUNTER — OFFICE VISIT (OUTPATIENT)
Dept: URGENT CARE | Facility: CLINIC | Age: 34
End: 2024-05-20
Payer: COMMERCIAL

## 2024-05-20 VITALS
WEIGHT: 150 LBS | RESPIRATION RATE: 17 BRPM | DIASTOLIC BLOOD PRESSURE: 74 MMHG | SYSTOLIC BLOOD PRESSURE: 113 MMHG | HEART RATE: 66 BPM | HEIGHT: 71 IN | OXYGEN SATURATION: 98 % | BODY MASS INDEX: 21 KG/M2 | TEMPERATURE: 98 F

## 2024-05-20 DIAGNOSIS — R30.0 DYSURIA: Primary | ICD-10-CM

## 2024-05-20 DIAGNOSIS — Z11.3 SCREEN FOR STD (SEXUALLY TRANSMITTED DISEASE): ICD-10-CM

## 2024-05-20 DIAGNOSIS — R36.9 PENILE DISCHARGE: ICD-10-CM

## 2024-05-20 LAB
BILIRUB UR QL STRIP: NEGATIVE
GLUCOSE UR QL STRIP: NEGATIVE
KETONES UR QL STRIP: NEGATIVE
LEUKOCYTE ESTERASE UR QL STRIP: POSITIVE
PH, POC UA: 6 (ref 5–8)
POC BLOOD, URINE: NEGATIVE
POC NITRATES, URINE: NEGATIVE
PROT UR QL STRIP: NEGATIVE
SP GR UR STRIP: 1.01 (ref 1–1.03)
UROBILINOGEN UR STRIP-ACNC: 1 (ref 0.3–2.2)

## 2024-05-20 PROCEDURE — 86803 HEPATITIS C AB TEST: CPT | Performed by: NURSE PRACTITIONER

## 2024-05-20 PROCEDURE — 96372 THER/PROPH/DIAG INJ SC/IM: CPT | Mod: S$GLB,,, | Performed by: NURSE PRACTITIONER

## 2024-05-20 PROCEDURE — 87086 URINE CULTURE/COLONY COUNT: CPT | Performed by: NURSE PRACTITIONER

## 2024-05-20 PROCEDURE — 87389 HIV-1 AG W/HIV-1&-2 AB AG IA: CPT | Performed by: NURSE PRACTITIONER

## 2024-05-20 PROCEDURE — 99214 OFFICE O/P EST MOD 30 MIN: CPT | Mod: 25,S$GLB,, | Performed by: NURSE PRACTITIONER

## 2024-05-20 PROCEDURE — 87340 HEPATITIS B SURFACE AG IA: CPT | Performed by: NURSE PRACTITIONER

## 2024-05-20 PROCEDURE — 81003 URINALYSIS AUTO W/O SCOPE: CPT | Mod: QW,S$GLB,, | Performed by: NURSE PRACTITIONER

## 2024-05-20 PROCEDURE — 87661 TRICHOMONAS VAGINALIS AMPLIF: CPT | Performed by: NURSE PRACTITIONER

## 2024-05-20 PROCEDURE — 87491 CHLMYD TRACH DNA AMP PROBE: CPT | Performed by: NURSE PRACTITIONER

## 2024-05-20 PROCEDURE — 86593 SYPHILIS TEST NON-TREP QUANT: CPT | Performed by: NURSE PRACTITIONER

## 2024-05-20 RX ORDER — CEFTRIAXONE 500 MG/1
500 INJECTION, POWDER, FOR SOLUTION INTRAMUSCULAR; INTRAVENOUS
Status: DISCONTINUED | OUTPATIENT
Start: 2024-05-20 | End: 2024-05-20

## 2024-05-20 RX ORDER — LIDOCAINE HYDROCHLORIDE 10 MG/ML
1 INJECTION INFILTRATION; PERINEURAL
Status: COMPLETED | OUTPATIENT
Start: 2024-05-20 | End: 2024-05-20

## 2024-05-20 RX ORDER — CEFTRIAXONE 1 G/1
0.5 INJECTION, POWDER, FOR SOLUTION INTRAMUSCULAR; INTRAVENOUS ONCE
Status: COMPLETED | OUTPATIENT
Start: 2024-05-20 | End: 2024-05-20

## 2024-05-20 RX ORDER — DOXYCYCLINE HYCLATE 100 MG
100 TABLET ORAL 2 TIMES DAILY
Qty: 14 TABLET | Refills: 0 | Status: SHIPPED | OUTPATIENT
Start: 2024-05-20 | End: 2024-05-27

## 2024-05-20 RX ADMIN — CEFTRIAXONE 0.5 G: 1 INJECTION, POWDER, FOR SOLUTION INTRAMUSCULAR; INTRAVENOUS at 05:05

## 2024-05-20 RX ADMIN — LIDOCAINE HYDROCHLORIDE 1 ML: 10 INJECTION INFILTRATION; PERINEURAL at 05:05

## 2024-05-20 NOTE — PATIENT INSTRUCTIONS
Preventive Measures:  Increase condom use to prevent the occurrence of sexually transmitted infections (STIs).  Notify sexual partners of the need for testing to prevent the spread of STIs.  Medication Compliance:  Complete all prescribed medication courses to ensure effective treatment of infections.  Abstain from Sexual [place] After Treatment:  Avoid sexual intercourse for 7 days after treatment to allow for effective treatment and recovery.  Retesting:  Retest to ensure infection clearance, as some infections may require more aggressive treatment.  Retest for all infections in 6 weeks and again in 6 months to ensure true negative results.  Importance of Testing:  Regular testing is crucial, especially for those with high-risk sexual behaviors.  Syphilis cases are rising, and gonorrhea has resistant strains, highlighting the importance of repeat testing and preventive measures.  Truvada for PrEP Users:  Individuals on Truvada for pre-exposure prophylaxis (PrEP) have additional protection against HIV only. Condoms should still be used for STI prevention.  Reliable Test Result Timelines:  Testing timelines for various infections are provided, ranging from 2 weeks for trichomonas, gonorrhea, and chlamydia to 6 weeks to 3 months for HIV, herpes, hepatitis C and B.  Repeat Testing:  If initial tests come back negative, it's important to repeat testing in 3-6 months to ensure ongoing negative status.  Regular testing, adherence to preventive measures, and open communication with sexual partners are essential for maintaining sexual health and preventing the spread of STIs. If you have any further questions or concerns, it's recommended to consult with a healthcare provider or sexual health professional.

## 2024-05-20 NOTE — PROGRESS NOTES
"Subjective:      Patient ID: Samson Aguilera is a 34 y.o. male.    Vitals:  height is 5' 10.98" (1.803 m) and weight is 68 kg (150 lb). His oral temperature is 97.6 °F (36.4 °C). His blood pressure is 113/74 and his pulse is 66. His respiration is 17 and oxygen saturation is 98%.     Chief Complaint: Exposure to STD    This is a 34 y.o male who presents today with chief complaint of burning with urination and penile discharge x1 week.   Home tx: none  Patient is requesting std testing.       Exposure to STD  The patient's primary symptoms include penile discharge. The patient's pertinent negatives include no genital injury, genital itching, genital lesions, pelvic pain, penile pain, priapism, scrotal swelling or testicular pain. The current episode started in the past 7 days. Associated symptoms include dysuria. Pertinent negatives include no abdominal pain, anorexia, chest pain, chills, constipation, coughing, diarrhea, discolored urine, fever, flank pain, frequency, headaches, hematuria, hesitancy, joint pain, joint swelling, nausea, painful intercourse, rash, shortness of breath, sore throat, urgency, urinary retention or vomiting. He has tried nothing for the symptoms. He is sexually active. His past medical history is significant for chlamydia and gonorrhea.       Constitution: Negative for chills and fever.   HENT:  Negative for sore throat.    Cardiovascular:  Negative for chest pain.   Respiratory:  Negative for cough and shortness of breath.    Gastrointestinal:  Negative for abdominal pain, nausea, vomiting, constipation and diarrhea.   Genitourinary:  Positive for dysuria and penile discharge. Negative for frequency, urgency, flank pain, genital sore, penile pain, penile swelling, scrotal swelling, testicular pain and pelvic pain.   Skin:  Negative for rash.   Neurological:  Negative for headaches.      Objective:     Physical Exam   Constitutional: He is oriented to person, place, and time. " He appears well-developed. No distress.   HENT:   Head: Normocephalic and atraumatic.   Ears:   Right Ear: External ear normal.   Left Ear: External ear normal.   Nose: Nose normal. No nasal deformity. No epistaxis.   Mouth/Throat: Oropharynx is clear and moist and mucous membranes are normal.   Eyes: Conjunctivae and lids are normal.   Neck: Trachea normal and phonation normal. Neck supple.   Cardiovascular: Normal rate, regular rhythm and normal heart sounds.   Pulmonary/Chest: Effort normal and breath sounds normal.   Abdominal: Normal appearance and bowel sounds are normal. He exhibits no distension. Soft. There is no abdominal tenderness.   Musculoskeletal: Normal range of motion.         General: Normal range of motion.   Neurological: He is alert and oriented to person, place, and time. He has normal reflexes.   Skin: Skin is warm, dry, intact and not diaphoretic.   Psychiatric: His speech is normal and behavior is normal. Judgment and thought content normal.   Nursing note and vitals reviewed.      Assessment:     1. Dysuria    2. Penile discharge    3. Screen for STD (sexually transmitted disease)        Plan:     Results for orders placed or performed in visit on 05/20/24   POCT Urinalysis, Dipstick, Manual, w/o Scope   Result Value Ref Range    POC Blood, Urine Negative Negative    POC Bilirubin, Urine Negative Negative    POC Urobilinogen, Urine 1.0 (A) 0.3 - 2.2    POC Ketones, Urine Negative Negative    POC Protein, Urine Negative Negative    POC Nitrates, Urine Negative Negative    POC Glucose, Urine Negative Negative    pH, UA 6.0 5 - 8    POC Specific Gravity, Urine 1.015 1.003 - 1.029    POC Leukocytes, Urine Positive (A) Negative         Dysuria  -     POCT Urinalysis, Dipstick, Manual, w/o Scope  -     C. trachomatis/N. gonorrhoeae by AMP DNA  -     HIV 1/2 Ag/Ab (4th Gen)  -     HEPATITIS B SURFACE ANTIGEN  -     HEPATITIS C ANTIBODY  -     Misc Sendout Test, Blood treponema pallidum  -      Cancel: Misc Sendout Test, Blood Trichomonas Vaginalis PCR  -     Discontinue: cefTRIAXone injection 500 mg  -     cefTRIAXone injection 0.5 g  -     LIDOcaine HCL 10 mg/ml (1%) injection 1 mL  -     doxycycline (VIBRA-TABS) 100 MG tablet; Take 1 tablet (100 mg total) by mouth 2 (two) times daily. for 7 days  Dispense: 14 tablet; Refill: 0  -     Urine culture  -     Misc Sendout Test, Non-Blood Trichomonas Vaginalis PCR    Penile discharge  -     C. trachomatis/N. gonorrhoeae by AMP DNA  -     HIV 1/2 Ag/Ab (4th Gen)  -     HEPATITIS B SURFACE ANTIGEN  -     HEPATITIS C ANTIBODY  -     Misc Sendout Test, Blood treponema pallidum  -     Cancel: Misc Sendout Test, Blood Trichomonas Vaginalis PCR  -     Discontinue: cefTRIAXone injection 500 mg  -     cefTRIAXone injection 0.5 g  -     LIDOcaine HCL 10 mg/ml (1%) injection 1 mL  -     doxycycline (VIBRA-TABS) 100 MG tablet; Take 1 tablet (100 mg total) by mouth 2 (two) times daily. for 7 days  Dispense: 14 tablet; Refill: 0  -     Misc Sendout Test, Non-Blood Trichomonas Vaginalis PCR    Screen for STD (sexually transmitted disease)  -     C. trachomatis/N. gonorrhoeae by AMP DNA  -     HIV 1/2 Ag/Ab (4th Gen)  -     HEPATITIS B SURFACE ANTIGEN  -     HEPATITIS C ANTIBODY  -     Misc Sendout Test, Blood treponema pallidum  -     Cancel: Misc Sendout Test, Blood Trichomonas Vaginalis PCR  -     Misc Sendout Test, Non-Blood Trichomonas Vaginalis PCR        Patient Instructions   Preventive Measures:  Increase condom use to prevent the occurrence of sexually transmitted infections (STIs).  Notify sexual partners of the need for testing to prevent the spread of STIs.  Medication Compliance:  Complete all prescribed medication courses to ensure effective treatment of infections.  Abstain from Sexual [place] After Treatment:  Avoid sexual intercourse for 7 days after treatment to allow for effective treatment and recovery.  Retesting:  Retest to ensure infection  clearance, as some infections may require more aggressive treatment.  Retest for all infections in 6 weeks and again in 6 months to ensure true negative results.  Importance of Testing:  Regular testing is crucial, especially for those with high-risk sexual behaviors.  Syphilis cases are rising, and gonorrhea has resistant strains, highlighting the importance of repeat testing and preventive measures.  Truvada for PrEP Users:  Individuals on Truvada for pre-exposure prophylaxis (PrEP) have additional protection against HIV only. Condoms should still be used for STI prevention.  Reliable Test Result Timelines:  Testing timelines for various infections are provided, ranging from 2 weeks for trichomonas, gonorrhea, and chlamydia to 6 weeks to 3 months for HIV, herpes, hepatitis C and B.  Repeat Testing:  If initial tests come back negative, it's important to repeat testing in 3-6 months to ensure ongoing negative status.  Regular testing, adherence to preventive measures, and open communication with sexual partners are essential for maintaining sexual health and preventing the spread of STIs. If you have any further questions or concerns, it's recommended to consult with a healthcare provider or sexual health professional.

## 2024-05-21 LAB
HBV SURFACE AG SERPL QL IA: NORMAL
HCV AB SERPL QL IA: NORMAL
HIV 1+2 AB+HIV1 P24 AG SERPL QL IA: NORMAL
TREPONEMA PALLIDUM IGG+IGM AB [PRESENCE] IN SERUM OR PLASMA BY IMMUNOASSAY: NONREACTIVE

## 2024-05-22 ENCOUNTER — TELEPHONE (OUTPATIENT)
Dept: URGENT CARE | Facility: CLINIC | Age: 34
End: 2024-05-22
Payer: COMMERCIAL

## 2024-05-22 LAB
BACTERIA UR CULT: NO GROWTH
C TRACH DNA SPEC QL NAA+PROBE: DETECTED
N GONORRHOEA DNA SPEC QL NAA+PROBE: DETECTED
SPECIMEN SOURCE: NORMAL
T VAGINALIS RRNA SPEC QL NAA+PROBE: NEGATIVE

## 2024-05-22 NOTE — TELEPHONE ENCOUNTER
Positive for gonorrhea and chlamydia, appropriately treated. Negative for other STDs. Informed patient to retest in 1-2 months and inform all sexual partners.

## 2024-05-27 ENCOUNTER — HOSPITAL ENCOUNTER (EMERGENCY)
Facility: HOSPITAL | Age: 34
Discharge: HOME | End: 2024-05-27
Attending: EMERGENCY MEDICINE
Payer: COMMERCIAL

## 2024-05-27 VITALS
WEIGHT: 150 LBS | HEART RATE: 82 BPM | TEMPERATURE: 98 F | HEIGHT: 70 IN | SYSTOLIC BLOOD PRESSURE: 118 MMHG | BODY MASS INDEX: 21.47 KG/M2 | RESPIRATION RATE: 20 BRPM | DIASTOLIC BLOOD PRESSURE: 72 MMHG | OXYGEN SATURATION: 100 %

## 2024-05-27 DIAGNOSIS — J06.9 UPPER RESPIRATORY TRACT INFECTION, UNSPECIFIED TYPE: Primary | ICD-10-CM

## 2024-05-27 LAB
INFLUENZA A, MOLECULAR: NOT DETECTED
INFLUENZA B, MOLECULAR: NOT DETECTED
RSV AG BY MOLECULAR METHOD: NOT DETECTED
SARS-COV-2 RNA RESP QL NAA+PROBE: NOT DETECTED

## 2024-05-27 PROCEDURE — 0241U SARS-COV2 (COVID) WITH FLU/RSV BY PCR: CPT | Performed by: EMERGENCY MEDICINE

## 2024-05-27 PROCEDURE — 96372 THER/PROPH/DIAG INJ SC/IM: CPT | Performed by: PHYSICIAN ASSISTANT

## 2024-05-27 PROCEDURE — 99284 EMERGENCY DEPT VISIT MOD MDM: CPT | Mod: 25

## 2024-05-27 PROCEDURE — 25000003 PHARM REV CODE 250: Performed by: PHYSICIAN ASSISTANT

## 2024-05-27 PROCEDURE — 63600175 PHARM REV CODE 636 W HCPCS: Performed by: PHYSICIAN ASSISTANT

## 2024-05-27 RX ORDER — AMOXICILLIN 500 MG/1
1000 CAPSULE ORAL
Status: COMPLETED | OUTPATIENT
Start: 2024-05-27 | End: 2024-05-27

## 2024-05-27 RX ORDER — DEXAMETHASONE SODIUM PHOSPHATE 4 MG/ML
10 INJECTION, SOLUTION INTRA-ARTICULAR; INTRALESIONAL; INTRAMUSCULAR; INTRAVENOUS; SOFT TISSUE
Status: COMPLETED | OUTPATIENT
Start: 2024-05-27 | End: 2024-05-27

## 2024-05-27 RX ORDER — AMOXICILLIN 875 MG/1
875 TABLET, FILM COATED ORAL EVERY 12 HOURS
Qty: 14 TABLET | Refills: 0 | Status: SHIPPED | OUTPATIENT
Start: 2024-05-27 | End: 2024-06-03

## 2024-05-27 RX ADMIN — DEXAMETHASONE SODIUM PHOSPHATE 10 MG: 4 INJECTION INTRA-ARTICULAR; INTRALESIONAL; INTRAMUSCULAR; INTRAVENOUS; SOFT TISSUE at 07:05

## 2024-05-27 RX ADMIN — AMOXICILLIN 1000 MG: 500 CAPSULE ORAL at 07:05

## 2024-05-27 NOTE — FIRST PROVIDER EVALUATION
"Medical screening examination initiated.  I have conducted a focused provider triage encounter, findings are as follows:    Brief history of present illness:  feeling unwell. Went to high school prom. Now having nasal congestion, cough, etc. No fevers. Having chills. Sx started Tuesday. No pain    Vitals:    05/27/24 1749   BP: 121/76   Pulse: 89   Resp: 20   Temp: 98.1 °F (36.7 °C)   TempSrc: Oral   SpO2: 99%   Weight: 68 kg (150 lb)   Height: 5' 10" (1.778 m)       Pertinent physical exam:  well appearing    Brief workup plan:  covid test    Preliminary workup initiated; this workup will be continued and followed by the physician or advanced practice provider that is assigned to the patient when roomed.  "

## 2024-05-27 NOTE — Clinical Note
"Samson Juarez" Willy was seen and treated in our emergency department on 5/27/2024.  He may return to work on 05/29/2024.       If you have any questions or concerns, please don't hesitate to call.       RN    "

## 2024-05-28 NOTE — ED TRIAGE NOTES
Chief Complaint   Patient presents with    Nasal Congestion     Nasal congestion x 1 week      APPEARANCE: No acute distress.    NEURO: Awake, alert, appropriate for age  HEENT: Head symmetrical. No obvious deformity  RESPIRATORY: Airway is open and patent. Respirations are spontaneous on room air.   NEUROVASCULAR: All extremities are warm and pink with capillary refill less than 3 seconds.   MUSCULOSKELETAL: Moves all extremities, wiggling toes and moving hands.   SKIN: Warm and dry, adequate turgor, mucus membranes moist and pink    Will continue to monitor.

## 2024-05-28 NOTE — ED PROVIDER NOTES
Encounter Date: 5/27/2024       History     Chief Complaint   Patient presents with    Nasal Congestion     Nasal congestion x 1 week      34-year-old male presenting with throat nasal congestion for 1 week.  Patient also endorses a sore throat.  Tried over-the-counter medications without relief.  Taking allergy medications without relief.  Fever chills.  No chest pain, no shortness a breath.  No abdominal pain.  No known drug allergies, takes no prescription medications.  Nondistressed.      Review of patient's allergies indicates:   Allergen Reactions    Shrimp Swelling     Past Medical History:   Diagnosis Date    STD (male)      Past Surgical History:   Procedure Laterality Date    NONE  10/17/2013     Family History   Problem Relation Name Age of Onset    Hypertension Father      Hypertension Paternal Grandmother      Stroke Paternal Grandfather      Diabetes Neg Hx      Cancer Neg Hx      Heart disease Neg Hx       Social History     Tobacco Use    Smoking status: Former    Smokeless tobacco: Never   Substance Use Topics    Alcohol use: No    Drug use: No     Review of Systems   Constitutional:  Negative for fever.   HENT:  Positive for congestion and postnasal drip. Negative for sore throat.    Respiratory:  Negative for shortness of breath.    Cardiovascular:  Negative for chest pain.   Gastrointestinal:  Negative for nausea.   Genitourinary:  Negative for dysuria.   Musculoskeletal:  Negative for back pain.   Skin:  Negative for rash.   Neurological:  Negative for weakness.   Hematological:  Does not bruise/bleed easily.       Physical Exam     Initial Vitals [05/27/24 1749]   BP Pulse Resp Temp SpO2   121/76 89 20 98.1 °F (36.7 °C) 99 %      MAP       --         Physical Exam    Constitutional: Vital signs are normal. He appears well-developed and well-nourished.   HENT:   Head: Normocephalic and atraumatic.   Right Ear: Hearing normal.   Left Ear: Hearing normal.   Bilateral sinus inflammation.  Right  worse than left.  Turbinates are swollen.  Maxillary sinuses tender.  Posterior oropharynx is slightly erythematous.  TMs are clear     Eyes: Conjunctivae are normal.   Cardiovascular:  Normal rate and regular rhythm.           Pulmonary/Chest:   Clear on exam   Abdominal: Abdomen is soft. Bowel sounds are normal.   Musculoskeletal:         General: Normal range of motion.     Neurological: He is alert and oriented to person, place, and time.   Skin: Skin is warm and intact.   Psychiatric: He has a normal mood and affect. His speech is normal and behavior is normal. Cognition and memory are normal.         ED Course   Procedures  Labs Reviewed   SARS-COV2 (COVID) WITH FLU/RSV BY PCR          Imaging Results    None          Medications   amoxicillin capsule 1,000 mg (has no administration in time range)   dexAMETHasone injection 10 mg (has no administration in time range)     Medical Decision Making  35 yo male presenting with postnasal drip and sore throat for 1 week,  Differential called viral infection, upper respiratory infection, pharyngitis, COVID, pneumonia, influenza      Plan:   Physical exam consistent with an upper respiratory infection.  Lungs are clear, doubt pneumonia.  Afebrile, doubt COVID or flu.  Speech is clear, doubt peritonsillar abscess or retropharyngeal abscess.  No signs of an ear infection.  Sinuses are swollen and tender.  Posterior oropharynx is erythematous.  Patient with symptoms for 1 week not improved with over-the-counter medications.  I will treat with antibiotics.  Decadron injection given the ED.  Stable for discharge.    Risk  Prescription drug management.                                      Clinical Impression:  Final diagnoses:  [J06.9] Upper respiratory tract infection, unspecified type (Primary)          ED Disposition Condition    Discharge Stable          ED Prescriptions       Medication Sig Dispense Start Date End Date Auth. Provider    amoxicillin (AMOXIL) 875 MG tablet  Take 1 tablet (875 mg total) by mouth every 12 (twelve) hours. for 7 days 14 tablet 5/27/2024 6/3/2024 Arvind Nunn PA-C          Follow-up Information    None          Arvind Nunn PA-C  05/27/24 1952

## 2024-05-28 NOTE — DISCHARGE INSTRUCTIONS

## 2024-07-10 ENCOUNTER — OCCUPATIONAL HEALTH (OUTPATIENT)
Dept: URGENT CARE | Facility: CLINIC | Age: 34
End: 2024-07-10

## 2024-07-10 DIAGNOSIS — Z13.9 ENCOUNTER FOR SCREENING: Primary | ICD-10-CM

## 2024-08-07 ENCOUNTER — HOSPITAL ENCOUNTER (EMERGENCY)
Facility: HOSPITAL | Age: 34
Discharge: HOME | End: 2024-08-07
Attending: STUDENT IN AN ORGANIZED HEALTH CARE EDUCATION/TRAINING PROGRAM
Payer: COMMERCIAL

## 2024-08-07 VITALS
DIASTOLIC BLOOD PRESSURE: 65 MMHG | WEIGHT: 155 LBS | SYSTOLIC BLOOD PRESSURE: 107 MMHG | RESPIRATION RATE: 18 BRPM | OXYGEN SATURATION: 99 % | BODY MASS INDEX: 22.19 KG/M2 | HEART RATE: 75 BPM | TEMPERATURE: 99 F | HEIGHT: 70 IN

## 2024-08-07 DIAGNOSIS — U07.1 COVID-19: Primary | ICD-10-CM

## 2024-08-07 LAB — SARS-COV-2 RDRP RESP QL NAA+PROBE: POSITIVE

## 2024-08-07 PROCEDURE — U0002 COVID-19 LAB TEST NON-CDC: HCPCS | Performed by: EMERGENCY MEDICINE

## 2024-08-07 PROCEDURE — 25000003 PHARM REV CODE 250: Performed by: EMERGENCY MEDICINE

## 2024-08-07 PROCEDURE — 99283 EMERGENCY DEPT VISIT LOW MDM: CPT

## 2024-08-07 RX ORDER — ACETAMINOPHEN 325 MG/1
650 TABLET ORAL
Status: COMPLETED | OUTPATIENT
Start: 2024-08-07 | End: 2024-08-07

## 2024-08-07 RX ORDER — BENZONATATE 100 MG/1
100 CAPSULE ORAL 3 TIMES DAILY PRN
Qty: 15 CAPSULE | Refills: 0 | Status: SHIPPED | OUTPATIENT
Start: 2024-08-07

## 2024-08-07 RX ORDER — FLUTICASONE PROPIONATE 50 MCG
1 SPRAY, SUSPENSION (ML) NASAL 2 TIMES DAILY PRN
Qty: 15 G | Refills: 0 | Status: SHIPPED | OUTPATIENT
Start: 2024-08-07 | End: 2024-09-06

## 2024-08-07 RX ADMIN — ACETAMINOPHEN 650 MG: 325 TABLET ORAL at 03:08

## 2024-08-07 NOTE — ED PROVIDER NOTES
Encounter Date: 8/7/2024       History     Chief Complaint   Patient presents with    Sore Throat     Says he is having snot dripping down the back of his throat for about 2 day, with fever    Fever     HPI  Patient is a previously healthy 34-year-old male who presents for sore throat, rhinorrhea, subjective fever at home.  Onset of symptoms yesterday.  Patient reports associated postnasal drip.  No chest pain, shortness breath, abdominal pain, vomiting or diarrhea.  No headache, myalgia.  Patient states he has been taking over-the-counter antipyretics at home with improvement.  No known sick contacts.    Review of patient's allergies indicates:   Allergen Reactions    Shrimp Swelling     Past Medical History:   Diagnosis Date    STD (male)      Past Surgical History:   Procedure Laterality Date    NONE  10/17/2013     Family History   Problem Relation Name Age of Onset    Hypertension Father      Hypertension Paternal Grandmother      Stroke Paternal Grandfather      Diabetes Neg Hx      Cancer Neg Hx      Heart disease Neg Hx       Social History     Tobacco Use    Smoking status: Former    Smokeless tobacco: Never   Substance Use Topics    Alcohol use: No    Drug use: No     Review of Systems  A full review of systems was obtained, see HPI for pertinent positives.    Physical Exam     Initial Vitals [08/07/24 1420]   BP Pulse Resp Temp SpO2   134/77 93 15 100 °F (37.8 °C) 96 %      MAP       --         Physical Exam  Constitutional: No acute distress, well-appearing, nontoxic  HENT: Normocephalic, atraumatic, posterior oropharynx benign, uvula midline without swelling, tonsils normal, no exudates, membranes moist  Neck: Supple, normal range of motion  Respiratory: Non-labored, lungs clear  Cardiovascular: Well perfused, normal rate, regular rhythm  Gastrointestinal: Soft, non-tender, non-distended  Integumentary: Warm and dry  Musculoskeletal: No deformity  Neurological: Awake and alert, normal  motor  Psychiatric: Cooperative     ED Course   Procedures  Labs Reviewed   SARS-COV-2 RNA AMPLIFICATION, QUAL - Abnormal       Result Value    SARS-CoV-2 RNA, Amplification, Qual Positive (*)           Imaging Results    None          Medications   acetaminophen tablet 650 mg (650 mg Oral Given 8/7/24 1510)     Medical Decision Making  Patient is a previously healthy 34-year-old male who presents for rhinorrhea, sore throat and subjective fever at home.  He was afebrile here with reassuring vitals and exam.  Symptoms consistent with viral etiology.  Will obtain COVID swab but regardless will treat symptomatically and provide supportive care.     Patient positive for COVID-19.  Overall, patient very well-appearing.  No hypoxia or increased work of breathing.  He has no chest pain or shortness of breath.  He was counseled on supportive care for home as well as return precautions.  He was advised to follow up with his primary care doctor within the next week.  I also discussed masking and return to work information with the patient.    Amount and/or Complexity of Data Reviewed  Labs:  Decision-making details documented in ED Course.    Risk  Prescription drug management.               ED Course as of 08/07/24 1628   Wed Aug 07, 2024   1616 SARS-CoV-2 RNA, Amplification, Qual(!): Positive [NN]      ED Course User Index  [NN] Lynn Elena MD                           Clinical Impression:  Final diagnoses:  [U07.1] COVID-19 (Primary)          ED Disposition Condition    Discharge Stable          ED Prescriptions       Medication Sig Dispense Start Date End Date Auth. Provider    benzonatate (TESSALON) 100 MG capsule Take 1 capsule (100 mg total) by mouth 3 (three) times daily as needed for Cough. 15 capsule 8/7/2024 -- Lynn Elena MD    fluticasone propionate (FLONASE) 50 mcg/actuation nasal spray 1 spray (50 mcg total) by Each Nostril route 2 (two) times daily as needed for Rhinitis. 15 g 8/7/2024  9/6/2024 Lynn Elena MD          Follow-up Information       Follow up With Specialties Details Why Contact Info    [facility] Emergency Medicine  As needed, If symptoms worsen [id number]  [facility] [id number] [phone]             Lynn Elena MD  08/07/24 [id number]

## 2024-08-07 NOTE — Clinical Note
"Samson Godinezdomenico Aguilera was seen and treated in our emergency department on 8/7/2024.  He may return to work on 08/09/2024.       If you have any questions or concerns, please don't hesitate to call.      Lynn Elena MD"

## 2024-08-07 NOTE — ED TRIAGE NOTES
Triage note:  Chief Complaint   Patient presents with    Sore Throat     Says he is having snot dripping down the back of his throat for about 2 day, with fever    Fever     Review of patient's allergies indicates:   Allergen Reactions    Shrimp Swelling     Past Medical History:   Diagnosis Date    STD (male)

## 2024-08-07 NOTE — FIRST PROVIDER EVALUATION
Medical screening examination initiated.  I have conducted a focused provider triage encounter, findings are as follows:    Brief history of present illness:  34 M runny nose and sore throat x 2 days with fever    There were no vitals filed for this visit.    Pertinent physical exam:  well appearing, no distress    Brief workup plan:  covid      Preliminary workup initiated; this workup will be continued and followed by the physician or advanced practice provider that is assigned to the patient when roomed.

## 2024-08-07 NOTE — DISCHARGE INSTRUCTIONS
You were diagnosed today with COVID-19.  I have sent a nasal spray and another medicine for cough to your pharmacy.  You can continue to take Tylenol or ibuprofen at home as needed for fever and body aches.  Please make sure to wear a mask for 10 days from the onset of her symptoms and continue to practice good hand washing.  You should call your employer to discuss specific guidelines regarding returning to work.  You should continue to mask for at least 10 days and should stay home until you have been fever free without any medicines for fever for 24 hours.  Please follow-up with your primary care doctor within the next week.  If your symptoms worsen or you develop persistent fever, difficulty breathing, shortness of breath, chest pain or any other concerns, you should return to the emergency department for re-evaluation.